# Patient Record
Sex: MALE | Race: WHITE | Employment: OTHER | URBAN - METROPOLITAN AREA
[De-identification: names, ages, dates, MRNs, and addresses within clinical notes are randomized per-mention and may not be internally consistent; named-entity substitution may affect disease eponyms.]

---

## 2017-04-11 RX ORDER — LISINOPRIL 20 MG/1
TABLET ORAL
Qty: 30 TABLET | Refills: 0 | Status: SHIPPED | OUTPATIENT
Start: 2017-04-11 | End: 2017-05-06

## 2017-05-06 ENCOUNTER — OFFICE VISIT (OUTPATIENT)
Dept: INTERNAL MEDICINE CLINIC | Facility: CLINIC | Age: 57
End: 2017-05-06

## 2017-05-06 ENCOUNTER — APPOINTMENT (OUTPATIENT)
Dept: LAB | Facility: HOSPITAL | Age: 57
End: 2017-05-06
Attending: INTERNAL MEDICINE
Payer: COMMERCIAL

## 2017-05-06 VITALS
HEART RATE: 82 BPM | SYSTOLIC BLOOD PRESSURE: 132 MMHG | BODY MASS INDEX: 25.57 KG/M2 | RESPIRATION RATE: 18 BRPM | TEMPERATURE: 98 F | DIASTOLIC BLOOD PRESSURE: 72 MMHG | HEIGHT: 73.2 IN | WEIGHT: 195 LBS

## 2017-05-06 DIAGNOSIS — I10 ESSENTIAL HYPERTENSION: ICD-10-CM

## 2017-05-06 DIAGNOSIS — Z00.00 ANNUAL PHYSICAL EXAM: Primary | ICD-10-CM

## 2017-05-06 DIAGNOSIS — Z00.00 ANNUAL PHYSICAL EXAM: ICD-10-CM

## 2017-05-06 DIAGNOSIS — E78.00 HYPERCHOLESTEROLEMIA: ICD-10-CM

## 2017-05-06 DIAGNOSIS — Z72.0 TOBACCO USE: ICD-10-CM

## 2017-05-06 PROCEDURE — 36415 COLL VENOUS BLD VENIPUNCTURE: CPT

## 2017-05-06 PROCEDURE — 80053 COMPREHEN METABOLIC PANEL: CPT

## 2017-05-06 PROCEDURE — 80061 LIPID PANEL: CPT

## 2017-05-06 PROCEDURE — 85027 COMPLETE CBC AUTOMATED: CPT

## 2017-05-06 PROCEDURE — 99396 PREV VISIT EST AGE 40-64: CPT | Performed by: INTERNAL MEDICINE

## 2017-05-06 RX ORDER — LISINOPRIL 20 MG/1
20 TABLET ORAL
Qty: 30 TABLET | Refills: 5 | Status: SHIPPED | OUTPATIENT
Start: 2017-05-06 | End: 2017-10-21

## 2017-05-06 NOTE — H&P
Ildfeonso Boyce is a 60year old male who presents for a complete physical exam.   HPI:   His last physical was September 2016. \"I feel great. \"  No specific issues for discussion today. He works as a  and is .   Occasional BP checks at loc dysuria or hematuria  MUSCULOSKELETAL: No leg swelling  NEURO: No headaches    EXAM:   GENERAL: Pleasant male appearing well in no distress  /72 mmHg  Pulse 82  Temp(Src) 97.6 °F (36.4 °C) (Oral)  Resp 18  Ht 6' 1.2\" (1.859 m)  Wt 195 lb (88.451 kg) normal.  Await labs    4. Tobacco use  Tobacco cessation advice as above.       Myron Abbott MD  5/6/2017  8:56 AM

## 2017-05-06 NOTE — PATIENT INSTRUCTIONS
Your physical today was normal.  Await results of blood tests. Please continue your current medication. Please continue to eat healthy and exercise regularly. Please try to stop smoking. Return visit in 6 months.

## 2017-10-21 RX ORDER — LISINOPRIL 20 MG/1
20 TABLET ORAL
Qty: 30 TABLET | Refills: 5 | Status: SHIPPED | OUTPATIENT
Start: 2017-10-21 | End: 2017-12-04 | Stop reason: ALTCHOICE

## 2017-12-04 ENCOUNTER — NURSE TRIAGE (OUTPATIENT)
Dept: OTHER | Age: 57
End: 2017-12-04

## 2017-12-04 ENCOUNTER — OFFICE VISIT (OUTPATIENT)
Dept: INTERNAL MEDICINE CLINIC | Facility: CLINIC | Age: 57
End: 2017-12-04

## 2017-12-04 VITALS
HEIGHT: 73 IN | HEART RATE: 94 BPM | WEIGHT: 199 LBS | BODY MASS INDEX: 26.37 KG/M2 | DIASTOLIC BLOOD PRESSURE: 82 MMHG | SYSTOLIC BLOOD PRESSURE: 152 MMHG

## 2017-12-04 DIAGNOSIS — I10 ESSENTIAL HYPERTENSION: Primary | ICD-10-CM

## 2017-12-04 PROCEDURE — 99213 OFFICE O/P EST LOW 20 MIN: CPT | Performed by: INTERNAL MEDICINE

## 2017-12-04 PROCEDURE — 99212 OFFICE O/P EST SF 10 MIN: CPT | Performed by: INTERNAL MEDICINE

## 2017-12-04 RX ORDER — LISINOPRIL AND HYDROCHLOROTHIAZIDE 20; 12.5 MG/1; MG/1
1 TABLET ORAL DAILY
Qty: 30 TABLET | Refills: 5 | Status: SHIPPED | OUTPATIENT
Start: 2017-12-04 | End: 2018-05-21

## 2017-12-04 NOTE — PROGRESS NOTES
Sheldon Tyson is a 59year old male. Patient presents with:  Headache    HPI:   Mr. Richard Jennings presents this afternoon concerned about elevated blood pressure. Since yesterday he has had a bilateral headache especially posteriorly.   Headache persisted today visit in 1 month for blood pressure check. Anticipate next physical May 2018. The patient indicates understanding of these issues and agrees to the plan. The patient is asked to return in 1 month.     Emery Najera MD  12/4/2017  1:51 PM

## 2017-12-04 NOTE — TELEPHONE ENCOUNTER
Action Requested: Summary for Provider     []  Critical Lab, Recommendations Needed  [] Need Additional Advice  []   FYI    []   Need Orders  [] Need Medications Sent to Pharmacy  []  Other     SUMMARY: Patient requesting appt today with Mn.  Appt made for

## 2017-12-04 NOTE — PATIENT INSTRUCTIONS
Please stop lisinopril and begin lisinopril/HCTZ 20/12.5 mg 1 pill daily. Return visit in 1 month for a blood pressure check.

## 2017-12-04 NOTE — TELEPHONE ENCOUNTER
Spoke with wife and states  called her with blood pressure of 174/96. Relayed to her that it is best if I call patient directly. Spoke with patient and he states headache behind ears started yesterday.  Patient states he feels \"a little dizzy, bu

## 2018-01-16 ENCOUNTER — OFFICE VISIT (OUTPATIENT)
Dept: INTERNAL MEDICINE CLINIC | Facility: CLINIC | Age: 58
End: 2018-01-16

## 2018-01-16 VITALS
TEMPERATURE: 99 F | SYSTOLIC BLOOD PRESSURE: 137 MMHG | HEART RATE: 88 BPM | HEIGHT: 73 IN | DIASTOLIC BLOOD PRESSURE: 84 MMHG | WEIGHT: 192 LBS | RESPIRATION RATE: 18 BRPM | BODY MASS INDEX: 25.45 KG/M2

## 2018-01-16 DIAGNOSIS — J06.9 UPPER RESPIRATORY TRACT INFECTION, UNSPECIFIED TYPE: Primary | ICD-10-CM

## 2018-01-16 DIAGNOSIS — I10 ESSENTIAL HYPERTENSION: ICD-10-CM

## 2018-01-16 DIAGNOSIS — H61.23 BILATERAL IMPACTED CERUMEN: ICD-10-CM

## 2018-01-16 PROCEDURE — 99212 OFFICE O/P EST SF 10 MIN: CPT | Performed by: INTERNAL MEDICINE

## 2018-01-16 PROCEDURE — 99214 OFFICE O/P EST MOD 30 MIN: CPT | Performed by: INTERNAL MEDICINE

## 2018-01-16 NOTE — PROGRESS NOTES
Jasmina Wong is a 59year old male. Patient presents with:  Cough: x4 days  Weakness  Hypertension      HPI:    Patient is here with the complaints of  scanty productive cough, going on for 4 days. He has pain in the ribs from coughing. Has chills.  No fe swelling in feet. GI Poor appetite, feels bloated. MUSK: Diffuse muscle pain, body aches. NEURO: denies tingling numbness or headaches  ALLERGY/ASTHMA: No seasonal allergy or asthma. HEME: no anemia, no abnormal bleeding or easy bruising.   PSYCH: Denie to remove , follow-up with ENT. Patient confirms understanding. Other orders  -     carbamide peroxide (DEBROX) 6.5 % Otic Solution; Place 5 drops into both ears 2 (two) times daily.             The patient indicates understanding of these issues and agre

## 2018-01-22 ENCOUNTER — TELEPHONE (OUTPATIENT)
Dept: OTHER | Age: 58
End: 2018-01-22

## 2018-01-22 NOTE — TELEPHONE ENCOUNTER
Pt calling into F/U. Pt calm on phone in no apparent distress. Denies chest pain, SOB, difficulty breathing. Feels very weak and slightly dizzy. No appetite wants to sleep all time. Fatigue and exhausted slight dizzy. Left work early.  Saw Dr Janette John

## 2018-01-23 ENCOUNTER — OFFICE VISIT (OUTPATIENT)
Dept: INTERNAL MEDICINE CLINIC | Facility: CLINIC | Age: 58
End: 2018-01-23

## 2018-01-23 ENCOUNTER — HOSPITAL ENCOUNTER (OUTPATIENT)
Dept: GENERAL RADIOLOGY | Facility: HOSPITAL | Age: 58
Discharge: HOME | End: 2018-01-23
Attending: INTERNAL MEDICINE
Payer: COMMERCIAL

## 2018-01-23 VITALS
SYSTOLIC BLOOD PRESSURE: 126 MMHG | HEIGHT: 73 IN | HEART RATE: 92 BPM | DIASTOLIC BLOOD PRESSURE: 72 MMHG | TEMPERATURE: 99 F | WEIGHT: 192 LBS | BODY MASS INDEX: 25.45 KG/M2

## 2018-01-23 DIAGNOSIS — I10 ESSENTIAL HYPERTENSION: ICD-10-CM

## 2018-01-23 DIAGNOSIS — J06.9 ACUTE URI: Primary | ICD-10-CM

## 2018-01-23 DIAGNOSIS — J06.9 ACUTE URI: ICD-10-CM

## 2018-01-23 PROCEDURE — 99213 OFFICE O/P EST LOW 20 MIN: CPT | Performed by: INTERNAL MEDICINE

## 2018-01-23 PROCEDURE — 71046 X-RAY EXAM CHEST 2 VIEWS: CPT | Performed by: INTERNAL MEDICINE

## 2018-01-23 PROCEDURE — 99212 OFFICE O/P EST SF 10 MIN: CPT | Performed by: INTERNAL MEDICINE

## 2018-01-23 RX ORDER — AMOXICILLIN 500 MG/1
1 CAPSULE ORAL 4 TIMES DAILY
Refills: 0 | COMMUNITY
Start: 2018-01-19 | End: 2018-01-31 | Stop reason: ALTCHOICE

## 2018-01-23 NOTE — PROGRESS NOTES
Jared Beach is a 59year old male. Patient presents with:  Cough  Fatigue    HPI:   Mr. Laurent Alexandre presents this morning for follow-up. At his last visit with me in December, lisinopril was changed to lisinopril/HCTZ.     For the past 9-10 days, he has had o (87.1 kg)   BMI 25.33 kg/m²   HEENT: Anicteric, conjunctiva pink, bilateral cerumen, turbinates normal without purulent drainage, no maxillary or frontal sinus tenderness, oropharynx normal without erythema ulceration swelling or exudate  NECK: Supple with

## 2018-01-23 NOTE — PATIENT INSTRUCTIONS
Await results of chest x-ray. Please continue current medications. Rest and drink extra fluid. Call if no better. Please schedule a physical in May.

## 2018-01-31 ENCOUNTER — OFFICE VISIT (OUTPATIENT)
Dept: INTERNAL MEDICINE CLINIC | Facility: CLINIC | Age: 58
End: 2018-01-31

## 2018-01-31 ENCOUNTER — APPOINTMENT (OUTPATIENT)
Dept: LAB | Facility: HOSPITAL | Age: 58
End: 2018-01-31
Attending: PHYSICIAN ASSISTANT
Payer: COMMERCIAL

## 2018-01-31 VITALS
WEIGHT: 195.31 LBS | DIASTOLIC BLOOD PRESSURE: 80 MMHG | BODY MASS INDEX: 26 KG/M2 | TEMPERATURE: 99 F | SYSTOLIC BLOOD PRESSURE: 142 MMHG | HEART RATE: 88 BPM | RESPIRATION RATE: 16 BRPM

## 2018-01-31 DIAGNOSIS — R10.30 INGUINAL PAIN, UNSPECIFIED LATERALITY: Primary | ICD-10-CM

## 2018-01-31 DIAGNOSIS — R10.30 INGUINAL PAIN, UNSPECIFIED LATERALITY: ICD-10-CM

## 2018-01-31 LAB
BILIRUB UR QL: NEGATIVE
CLARITY UR: CLEAR
COLOR UR: YELLOW
GLUCOSE UR-MCNC: NEGATIVE MG/DL
HGB UR QL STRIP.AUTO: NEGATIVE
KETONES UR-MCNC: NEGATIVE MG/DL
LEUKOCYTE ESTERASE UR QL STRIP.AUTO: NEGATIVE
NITRITE UR QL STRIP.AUTO: NEGATIVE
PH UR: 6 [PH] (ref 5–8)
PROT UR-MCNC: NEGATIVE MG/DL
SP GR UR STRIP: 1.02 (ref 1–1.03)
UROBILINOGEN UR STRIP-ACNC: <2
VIT C UR-MCNC: NEGATIVE MG/DL

## 2018-01-31 PROCEDURE — 81003 URINALYSIS AUTO W/O SCOPE: CPT

## 2018-01-31 PROCEDURE — 99213 OFFICE O/P EST LOW 20 MIN: CPT | Performed by: PHYSICIAN ASSISTANT

## 2018-01-31 PROCEDURE — 87086 URINE CULTURE/COLONY COUNT: CPT

## 2018-01-31 NOTE — PATIENT INSTRUCTIONS
Groin Strain (Adult)     A groin strain is a stretching or partial tearing of the muscle in the lower abdomen or upper thigh. This may happen because of too much coughing, heavy lifting, or active sports.  The pain may last for several days to weeks, depe © 6397-2431 [employer] [address]. [address] [facility], [facility] [place]. All rights reserved. This information is not intended as a substitute for professional medical care. Always follow your healthcare professional's instructions.

## 2018-01-31 NOTE — PROGRESS NOTES
HPI:    Patient ID: Lyudmila Barajas is a 59year old male. HPI   Patient with history of hypertension, hyperlipidemia, and tobacco use presents today with bilateral groin pain since yesterday morning.   He states that when he got dressed and sat down in th reviewed. Constitutional: He appears well-developed and well-nourished. Cardiovascular: Normal rate, regular rhythm and normal heart sounds. Pulmonary/Chest: Effort normal and breath sounds normal. He has no wheezes. He has no rales.    Abdominal: So

## 2018-03-04 ENCOUNTER — OFFICE VISIT (OUTPATIENT)
Dept: FAMILY MEDICINE CLINIC | Facility: CLINIC | Age: 58
End: 2018-03-04

## 2018-03-04 VITALS
BODY MASS INDEX: 26 KG/M2 | OXYGEN SATURATION: 99 % | HEART RATE: 82 BPM | WEIGHT: 196 LBS | DIASTOLIC BLOOD PRESSURE: 82 MMHG | TEMPERATURE: 98 F | SYSTOLIC BLOOD PRESSURE: 148 MMHG

## 2018-03-04 DIAGNOSIS — L03.012 CELLULITIS OF LEFT MIDDLE FINGER: Primary | ICD-10-CM

## 2018-03-04 PROCEDURE — 99202 OFFICE O/P NEW SF 15 MIN: CPT | Performed by: NURSE PRACTITIONER

## 2018-03-04 RX ORDER — CEPHALEXIN 500 MG/1
CAPSULE ORAL
Qty: 21 CAPSULE | Refills: 0 | Status: SHIPPED | OUTPATIENT
Start: 2018-03-04 | End: 2018-11-09

## 2018-03-04 NOTE — PROGRESS NOTES
CHIEF COMPLAINT:   Patient presents with:  Finger Pain: Probable finger infection on left. Sxs very gradual over the past month. HPI:     Lyudmila Barajas is a 59year old male who presents with concerns of infection to left 3rd finger.   He started with /82   Pulse 82   Temp 97.9 °F (36.6 °C) (Oral)   Wt 196 lb   SpO2 99%   BMI 25.86 kg/m²   GENERAL: well developed, well nourished, and in no apparent distress  SKIN: +Left 3rd finger with mild erythema/edema and moderate tenderness- begins at cuticle Cellulitis is treated with antibiotics taken for 7 to 10 days. An open sore may be cleaned and covered with cool wet gauze. Symptoms should get better 1 to 2 days after treatment is started.  Make sure to take all the antibiotics for the full number of days

## 2018-03-04 NOTE — PATIENT INSTRUCTIONS
Cellulitis  Cellulitis is an infection of the deep layers of skin. A break in the skin, such as a cut or scratch, can let bacteria under the skin. If the bacteria get to deep layers of the skin, it can be serious.  If not treated, cellulitis can get into · Fever higher of 100.4º F (38.0º C) or higher after 2 days on antibiotics  Date Last Reviewed: 9/1/2016  © 3789-0662 [employer] [address]. [address] [facility] [address] [place] [address]. All rights reserved.  This information is not intended as a substitut

## 2018-05-21 RX ORDER — LISINOPRIL AND HYDROCHLOROTHIAZIDE 20; 12.5 MG/1; MG/1
1 TABLET ORAL DAILY
Qty: 90 TABLET | Refills: 0 | Status: SHIPPED | OUTPATIENT
Start: 2018-05-21 | End: 2018-07-12

## 2018-05-21 NOTE — TELEPHONE ENCOUNTER
Hypertensive Medications  Protocol Criteria:  · Appointment scheduled in the past 6 months or in the next 3 months  · BMP or CMP in the past 12 months  · Creatinine result < 2  Recent Outpatient Visits            2 months ago Cellulitis of left middle fing

## 2018-07-12 RX ORDER — LISINOPRIL AND HYDROCHLOROTHIAZIDE 20; 12.5 MG/1; MG/1
TABLET ORAL
Qty: 90 TABLET | Refills: 0 | Status: SHIPPED | OUTPATIENT
Start: 2018-07-12 | End: 2018-11-09

## 2018-11-09 ENCOUNTER — OFFICE VISIT (OUTPATIENT)
Dept: INTERNAL MEDICINE CLINIC | Facility: CLINIC | Age: 58
End: 2018-11-09

## 2018-11-09 ENCOUNTER — APPOINTMENT (OUTPATIENT)
Dept: LAB | Facility: HOSPITAL | Age: 58
End: 2018-11-09
Attending: INTERNAL MEDICINE
Payer: COMMERCIAL

## 2018-11-09 VITALS
BODY MASS INDEX: 26.71 KG/M2 | DIASTOLIC BLOOD PRESSURE: 78 MMHG | SYSTOLIC BLOOD PRESSURE: 146 MMHG | HEIGHT: 73 IN | WEIGHT: 201.5 LBS | HEART RATE: 85 BPM

## 2018-11-09 DIAGNOSIS — I10 ESSENTIAL HYPERTENSION: ICD-10-CM

## 2018-11-09 DIAGNOSIS — E78.00 HYPERCHOLESTEROLEMIA: ICD-10-CM

## 2018-11-09 DIAGNOSIS — Z00.00 ANNUAL PHYSICAL EXAM: Primary | ICD-10-CM

## 2018-11-09 DIAGNOSIS — I70.0 AORTIC ATHEROSCLEROSIS (HCC): ICD-10-CM

## 2018-11-09 DIAGNOSIS — Z00.00 ANNUAL PHYSICAL EXAM: ICD-10-CM

## 2018-11-09 DIAGNOSIS — Z72.0 TOBACCO USE: ICD-10-CM

## 2018-11-09 PROCEDURE — 99396 PREV VISIT EST AGE 40-64: CPT | Performed by: INTERNAL MEDICINE

## 2018-11-09 PROCEDURE — 36415 COLL VENOUS BLD VENIPUNCTURE: CPT

## 2018-11-09 PROCEDURE — 80053 COMPREHEN METABOLIC PANEL: CPT

## 2018-11-09 PROCEDURE — 85027 COMPLETE CBC AUTOMATED: CPT

## 2018-11-09 PROCEDURE — 80061 LIPID PANEL: CPT

## 2018-11-09 PROCEDURE — 83036 HEMOGLOBIN GLYCOSYLATED A1C: CPT

## 2018-11-09 RX ORDER — LISINOPRIL AND HYDROCHLOROTHIAZIDE 20; 12.5 MG/1; MG/1
1 TABLET ORAL
Qty: 90 TABLET | Refills: 1 | Status: SHIPPED | OUTPATIENT
Start: 2018-11-09 | End: 2019-05-04

## 2018-11-09 RX ORDER — AMLODIPINE BESYLATE 2.5 MG/1
2.5 TABLET ORAL DAILY
Qty: 90 TABLET | Refills: 1 | Status: SHIPPED | OUTPATIENT
Start: 2018-11-09 | End: 2019-05-04

## 2018-11-09 NOTE — H&P
Lopez Sanchez is a 59year old male who presents for a complete physical exam.   HPI:   His last physical was May 2017. \"I feel great. \"  No specific issues for discussion today. Retired .  Lives with his wife.   Occasional BP checks at [facility] breath  CARDIAC: No lightheadedness palpitations or chest pain  GI: No anorexia heartburn dysphagia nausea vomiting abdominal pain diarrhea constipation or rectal bleeding  : No urinary frequency dysuria or hematuria  MUSCULOSKELETAL: No leg swelling  NE Future  - OCCULT BLOOD, FECAL, IMMUNOASSAY; Future    2. Essential hypertension  BP elevated. Add amlodipine as above with follow-up in 2 months    3. Hypercholesterolemia  Await labs    4. Aortic atherosclerosis (HCC)  Asymptomatic.   Risk factor control

## 2018-11-09 NOTE — PATIENT INSTRUCTIONS
Await results of today's blood tests. Please also perform stool testing to check your colon. Please try to stop smoking. Please follow a healthy diet and try to exercise regularly. Continue lisinopril/HCTZ. Begin amlodipine 2.5 mg 1 pill daily.   Retur

## 2018-11-16 ENCOUNTER — TELEPHONE (OUTPATIENT)
Dept: OTHER | Age: 58
End: 2018-11-16

## 2018-11-16 NOTE — TELEPHONE ENCOUNTER
Noted
Pt states he received his test results and had questions about managing his cholesterol as noted his levels were elevated. Reviewed diet and exercise information with pt, information from references in Epic mailed to pt at home address on file.     Doctor,
EENMT

## 2019-01-21 ENCOUNTER — OFFICE VISIT (OUTPATIENT)
Dept: FAMILY MEDICINE CLINIC | Facility: CLINIC | Age: 59
End: 2019-01-21

## 2019-01-21 ENCOUNTER — TELEPHONE (OUTPATIENT)
Dept: INTERNAL MEDICINE CLINIC | Facility: CLINIC | Age: 59
End: 2019-01-21

## 2019-01-21 VITALS
WEIGHT: 202 LBS | HEART RATE: 80 BPM | OXYGEN SATURATION: 99 % | SYSTOLIC BLOOD PRESSURE: 136 MMHG | BODY MASS INDEX: 26.77 KG/M2 | TEMPERATURE: 98 F | DIASTOLIC BLOOD PRESSURE: 78 MMHG | HEIGHT: 73 IN | RESPIRATION RATE: 18 BRPM

## 2019-01-21 DIAGNOSIS — J06.9 VIRAL URI WITH COUGH: Primary | ICD-10-CM

## 2019-01-21 DIAGNOSIS — F17.200 CURRENT SMOKER: ICD-10-CM

## 2019-01-21 PROCEDURE — 99213 OFFICE O/P EST LOW 20 MIN: CPT | Performed by: NURSE PRACTITIONER

## 2019-01-21 NOTE — PATIENT INSTRUCTIONS
· Hydrate! (cold or hot based on comfort). Drink lots of water or other non dehydrating liquids to help with illness. Salty foods, soups and tea can help with throat pain.    · Hand washing-use hand  or wash hands frequently, cover your cough · If symptoms are severe, rest at home for the first 2 to 3 days. When you resume activity, don't let yourself get too tired. · Avoid being exposed to cigarette smoke (yours or others’).   · You may use acetaminophen or ibuprofen to control pain and fever, © 0567-1005 [employer] [address]. [address] [facility], [address] [place]. All rights reserved. This information is not intended as a substitute for professional medical care. Always follow your healthcare professional's instructions.           Adult · As your appetite returns, you can resume your normal diet. Ask your healthcare provider if there are any foods you should avoid.   When to seek medical care  When you first notice symptoms, ask your healthcare provider if antiviral medicines are appropria

## 2019-01-21 NOTE — TELEPHONE ENCOUNTER
Pt said he was just called to let the PCP know his BP is doing perfect and he wanted to give the reading of the /78    Just want to advise

## 2019-01-21 NOTE — PROGRESS NOTES
CHIEF COMPLAINT:   Patient presents with:  Cough/URI      HPI:   Meghan Duff is a 59year old male who presents for uri symptoms for  1 weeks. Patient reports congestion, dry cough. Symptoms have been improving since onset.   Treating symptoms with hernandez /78 (BP Location: Left arm, Patient Position: Sitting, Cuff Size: large)   Pulse 80   Temp 98.1 °F (36.7 °C) (Oral)   Resp 18   Ht 73\"   Wt 202 lb   SpO2 99%   BMI 26.65 kg/m²   GENERAL: well developed, well nourished,in no apparent distress  SKIN: · Hand washing-use hand  or wash hands frequently, cover your cough or sneeze, do not share towels or drinks with others. · Salt water gargles (1 tsp. Salt in 6 oz lukewarm water), use several times daily to help decrease swelling and pain.   · Us · You may use acetaminophen or ibuprofen to control pain and fever, unless another medicine was prescribed. If you have chronic liver or kidney disease, have ever had a stomach ulcer or gastrointestinal bleeding, or are taking blood-thinning medicines, inocencia Colds are caused by viruses. They can't be cured with antibiotics. However, you can ease symptoms and support your body's efforts to heal itself.  No matter which symptoms you have, be sure to:  · Drink plenty of fluids (water or clear soup)  · Stop smoking When you first notice symptoms, ask your healthcare provider if antiviral medicines are appropriate. Antibiotics should not be taken for colds or flu.  Also, call your healthcare provider if you have any of the following symptoms or if you aren't feeling be

## 2019-05-04 RX ORDER — AMLODIPINE BESYLATE 2.5 MG/1
TABLET ORAL
Qty: 90 TABLET | Refills: 0 | Status: SHIPPED | OUTPATIENT
Start: 2019-05-04 | End: 2019-08-04

## 2019-05-04 RX ORDER — LISINOPRIL AND HYDROCHLOROTHIAZIDE 20; 12.5 MG/1; MG/1
TABLET ORAL
Qty: 90 TABLET | Refills: 0 | Status: SHIPPED | OUTPATIENT
Start: 2019-05-04 | End: 2019-08-04

## 2019-08-05 RX ORDER — LISINOPRIL AND HYDROCHLOROTHIAZIDE 20; 12.5 MG/1; MG/1
TABLET ORAL
Qty: 90 TABLET | Refills: 0 | Status: SHIPPED | OUTPATIENT
Start: 2019-08-05 | End: 2019-10-26

## 2019-08-05 RX ORDER — AMLODIPINE BESYLATE 2.5 MG/1
TABLET ORAL
Qty: 90 TABLET | Refills: 0 | Status: SHIPPED | OUTPATIENT
Start: 2019-08-05 | End: 2019-10-29

## 2019-08-07 NOTE — TELEPHONE ENCOUNTER
Pt's spouse called in to advise that pt will wait to schedulel his px appt for November. Pt is looking to come in on a Saturday. Pt's spouse stated that pt will call back in October to schedule a px.

## 2019-09-23 ENCOUNTER — TELEPHONE (OUTPATIENT)
Dept: CASE MANAGEMENT | Age: 59
End: 2019-09-23

## 2019-10-09 NOTE — TELEPHONE ENCOUNTER
Pt is requesting refill on Clotrimazole-Betamethasone Cream  Apply 1 Tube Topically 2(two) times daily

## 2019-10-14 RX ORDER — CLOTRIMAZOLE AND BETAMETHASONE DIPROPIONATE 10; .64 MG/G; MG/G
1 CREAM TOPICAL 2 TIMES DAILY
Qty: 45 G | Refills: 1 | Status: SHIPPED | OUTPATIENT
Start: 2019-10-14 | End: 2020-10-13

## 2019-10-14 RX ORDER — CLOTRIMAZOLE AND BETAMETHASONE DIPROPIONATE 10; .64 MG/G; MG/G
1 CREAM TOPICAL 2 TIMES DAILY
Qty: 15 G | Refills: 1 | Status: SHIPPED | OUTPATIENT
Start: 2019-10-14 | End: 2019-10-14

## 2019-10-14 NOTE — TELEPHONE ENCOUNTER
Wife called asking if the cream can be filled.  Patient will be seeing Dr Neil Garcia in November for a physical.    Script is pended for review and approval. thanks

## 2019-10-28 RX ORDER — LISINOPRIL AND HYDROCHLOROTHIAZIDE 20; 12.5 MG/1; MG/1
TABLET ORAL
Qty: 90 TABLET | Refills: 1 | Status: SHIPPED | OUTPATIENT
Start: 2019-10-28 | End: 2019-12-21

## 2019-10-30 RX ORDER — AMLODIPINE BESYLATE 2.5 MG/1
TABLET ORAL
Qty: 90 TABLET | Refills: 0 | Status: SHIPPED | OUTPATIENT
Start: 2019-10-30 | End: 2019-12-21

## 2019-10-30 NOTE — TELEPHONE ENCOUNTER
pls advise, thanks in advance.        Hypertensive Medications  Protocol Criteria:  · Appointment scheduled in the past 6 months or in the next 3 months  · BMP or CMP in the past 12 months  · Creatinine result < 2  Recent Outpatient Visits            9 cait

## 2019-12-21 ENCOUNTER — OFFICE VISIT (OUTPATIENT)
Dept: INTERNAL MEDICINE CLINIC | Facility: CLINIC | Age: 59
End: 2019-12-21

## 2019-12-21 ENCOUNTER — APPOINTMENT (OUTPATIENT)
Dept: LAB | Facility: HOSPITAL | Age: 59
End: 2019-12-21
Attending: INTERNAL MEDICINE
Payer: COMMERCIAL

## 2019-12-21 VITALS
DIASTOLIC BLOOD PRESSURE: 78 MMHG | SYSTOLIC BLOOD PRESSURE: 132 MMHG | HEIGHT: 73 IN | WEIGHT: 196 LBS | RESPIRATION RATE: 16 BRPM | HEART RATE: 86 BPM | BODY MASS INDEX: 25.98 KG/M2

## 2019-12-21 DIAGNOSIS — E78.00 HYPERCHOLESTEROLEMIA: ICD-10-CM

## 2019-12-21 DIAGNOSIS — I70.0 AORTIC ATHEROSCLEROSIS (HCC): ICD-10-CM

## 2019-12-21 DIAGNOSIS — Z00.00 ANNUAL PHYSICAL EXAM: Primary | ICD-10-CM

## 2019-12-21 DIAGNOSIS — I10 ESSENTIAL HYPERTENSION: ICD-10-CM

## 2019-12-21 DIAGNOSIS — Z00.00 ANNUAL PHYSICAL EXAM: ICD-10-CM

## 2019-12-21 DIAGNOSIS — Z72.0 TOBACCO USE: ICD-10-CM

## 2019-12-21 PROCEDURE — 80061 LIPID PANEL: CPT

## 2019-12-21 PROCEDURE — 99396 PREV VISIT EST AGE 40-64: CPT | Performed by: INTERNAL MEDICINE

## 2019-12-21 PROCEDURE — 36415 COLL VENOUS BLD VENIPUNCTURE: CPT

## 2019-12-21 PROCEDURE — 83036 HEMOGLOBIN GLYCOSYLATED A1C: CPT

## 2019-12-21 PROCEDURE — 80053 COMPREHEN METABOLIC PANEL: CPT

## 2019-12-21 PROCEDURE — 85027 COMPLETE CBC AUTOMATED: CPT

## 2019-12-21 RX ORDER — LISINOPRIL AND HYDROCHLOROTHIAZIDE 20; 12.5 MG/1; MG/1
1 TABLET ORAL
Qty: 90 TABLET | Refills: 1 | Status: SHIPPED | OUTPATIENT
Start: 2019-12-21 | End: 2020-08-10

## 2019-12-21 RX ORDER — AMLODIPINE BESYLATE 2.5 MG/1
2.5 TABLET ORAL
Qty: 90 TABLET | Refills: 1 | Status: SHIPPED | OUTPATIENT
Start: 2019-12-21 | End: 2020-07-18

## 2019-12-21 NOTE — PATIENT INSTRUCTIONS
Await results of blood tests. Please complete the stool card for colon cancer screening. Continue current medications. Please continue to try to stop smoking. Continue healthy diet and regular walking. Return visit in 6 months.

## 2019-12-21 NOTE — H&P
Meghan Duff is a 62year old male who presents for a complete physical exam.   HPI:   His last visit here was for a physical November 2018. At that time, amlodipine was started and follow-up was advised. Lately he has been feeling very well.   No spec Maternal Grandmother    • Hypertension Maternal Aunt    • Hypertension Maternal Uncle       Social History:  Social History    Tobacco Use      Smoking status: Current Every Day Smoker        Packs/day: 1.00        Years: 35.00        Pack years: 28 FIT sent. Continue current medications. Prescription refills for 6 months sent to pharmacy. Continue healthy diet and regular exercise, maintaining current body weight. Encouraged further attempts at tobacco cessation. Return visit in 6 months.   - amL

## 2019-12-26 ENCOUNTER — TELEPHONE (OUTPATIENT)
Dept: INTERNAL MEDICINE CLINIC | Facility: CLINIC | Age: 59
End: 2019-12-26

## 2019-12-26 DIAGNOSIS — E78.00 HYPERCHOLESTEROLEMIA: Primary | ICD-10-CM

## 2019-12-26 NOTE — TELEPHONE ENCOUNTER
Henry Juarez, patient's wife, stated that her  agrees to take the cholestrol medication. (See LETTER 12/21/19)      Thank you.

## 2019-12-27 RX ORDER — ATORVASTATIN CALCIUM 20 MG/1
20 TABLET, FILM COATED ORAL NIGHTLY
Qty: 90 TABLET | Refills: 1 | Status: SHIPPED | OUTPATIENT
Start: 2019-12-27 | End: 2020-03-27 | Stop reason: ALTCHOICE

## 2019-12-27 NOTE — TELEPHONE ENCOUNTER
RX for atorvastatin 20 mg one pill nightly sent to pharmacy. Recommend labs for lipid panel, AST and ALT in 2 months.  Order signed

## 2019-12-27 NOTE — TELEPHONE ENCOUNTER
Dr. Mainsha Hamlin, patient agrees to start cholesterol medication. Please advise.      Please reply to pool: EM TRIAGE SUPPORT

## 2019-12-27 NOTE — TELEPHONE ENCOUNTER
Left detailed message for patient (JOHN on file to do so) regarding provider's response/orders below. Advised to call back with questions or concerns.

## 2020-02-01 ENCOUNTER — NURSE TRIAGE (OUTPATIENT)
Dept: OTHER | Age: 60
End: 2020-02-01

## 2020-02-01 NOTE — TELEPHONE ENCOUNTER
Action Requested: Summary for Provider     []  Critical Lab, Recommendations Needed  [] Need Additional Advice  []   FYI    []   Need Orders  [] Need Medications Sent to Pharmacy  []  Other     SUMMARY: Spouse report left hand index finger lump by the knuc

## 2020-02-14 NOTE — TELEPHONE ENCOUNTER
Wife calling as she wants to changed her Saturday appt to Wed because she has a appt with  on Wed. I noted pt did not have a appt with  for Saturday feb 15. Appt was made for pt to see  on 2/19/2020 at 9:50.  Per wife the lump is

## 2020-02-19 ENCOUNTER — OFFICE VISIT (OUTPATIENT)
Dept: INTERNAL MEDICINE CLINIC | Facility: CLINIC | Age: 60
End: 2020-02-19

## 2020-02-19 VITALS
HEIGHT: 73 IN | WEIGHT: 195.63 LBS | BODY MASS INDEX: 25.93 KG/M2 | HEART RATE: 84 BPM | DIASTOLIC BLOOD PRESSURE: 76 MMHG | SYSTOLIC BLOOD PRESSURE: 136 MMHG

## 2020-02-19 DIAGNOSIS — L72.3 SEBACEOUS CYST OF FINGER: Primary | ICD-10-CM

## 2020-02-19 PROCEDURE — 99213 OFFICE O/P EST LOW 20 MIN: CPT | Performed by: INTERNAL MEDICINE

## 2020-02-19 NOTE — PROGRESS NOTES
Milan Aparicio is a 62year old male. Patient presents with:  Lump: middle finger on left hand    HPI:   For the past 1 month he has had a persistent lump on his left middle finger. No injury or trauma. Size stable. No pain or discomfort.   No bleeding or approximately 5 mm soft nontender subcutaneous mass dorsal surface proximal phalanx left middle finger without overlying erythema or warmth      ASSESSMENT AND PLAN:   1.  Sebaceous cyst of finger  Benign cyst.  Discussed options including observation versu

## 2020-02-19 NOTE — PATIENT INSTRUCTIONS
Observe the small cyst on your left middle finger and call or return if it worsens. Begin atorvastatin and recheck labs in 4-6 weeks. Return visit in June.

## 2020-03-27 ENCOUNTER — TELEPHONE (OUTPATIENT)
Dept: INTERNAL MEDICINE CLINIC | Facility: CLINIC | Age: 60
End: 2020-03-27

## 2020-03-27 RX ORDER — ROSUVASTATIN CALCIUM 10 MG/1
10 TABLET, COATED ORAL NIGHTLY
Qty: 30 TABLET | Refills: 5 | Status: SHIPPED | OUTPATIENT
Start: 2020-03-27 | End: 2021-01-22

## 2020-03-27 NOTE — TELEPHONE ENCOUNTER
Talked to the wife who is on HIPPA with the patient by her and informed of the new medication sent to the pharmacy with understanding.

## 2020-03-27 NOTE — TELEPHONE ENCOUNTER
Call transferred by CSS: Spouse Martha (JOHN on file) states pt had been taking atorvastatin 20 mg since December 2019 and has had loose stools mainly in the morning with pelvic cramping.  States they decided to stop it for 2 days since they read these were

## 2020-07-18 DIAGNOSIS — Z00.00 ANNUAL PHYSICAL EXAM: ICD-10-CM

## 2020-07-18 RX ORDER — AMLODIPINE BESYLATE 2.5 MG/1
TABLET ORAL
Qty: 90 TABLET | Refills: 0 | Status: SHIPPED | OUTPATIENT
Start: 2020-07-18 | End: 2020-10-24

## 2020-08-10 ENCOUNTER — TELEPHONE (OUTPATIENT)
Dept: INTERNAL MEDICINE CLINIC | Facility: CLINIC | Age: 60
End: 2020-08-10

## 2020-08-10 DIAGNOSIS — Z00.00 ANNUAL PHYSICAL EXAM: ICD-10-CM

## 2020-08-10 RX ORDER — LISINOPRIL AND HYDROCHLOROTHIAZIDE 20; 12.5 MG/1; MG/1
1 TABLET ORAL
Qty: 30 TABLET | Refills: 0 | Status: SHIPPED | OUTPATIENT
Start: 2020-08-10 | End: 2020-09-06

## 2020-08-10 NOTE — TELEPHONE ENCOUNTER
Patient called and advised he got called to be out of town this weekend for business unexpectedly. They would like to know if he can get a 1 month supply of the medication listed below? Patient has about a week left. Please Advise.       Please Use Phar

## 2020-08-27 NOTE — TELEPHONE ENCOUNTER
Patient's wife calling in, patient is pretty sure he cannot make appt he had scheduled. They are requesting that you refill until his annual physical is due in December. Please contact patient's wife and leave message on machine with 's answer.

## 2020-08-27 NOTE — TELEPHONE ENCOUNTER
As I have previously stated, he should schedule an office visit.   If he will not or cannot, I will refill his medications until December but he needs to see me in December for a physical.  If he does not come in for a physical in December, I will not feel

## 2020-08-27 NOTE — TELEPHONE ENCOUNTER
Contacted bobby, pt wife. Informed message below . Pt wife verbalized understanding. Will make appt for physical. Transferred pt to central scheduling.

## 2020-09-06 DIAGNOSIS — Z00.00 ANNUAL PHYSICAL EXAM: ICD-10-CM

## 2020-09-06 RX ORDER — LISINOPRIL AND HYDROCHLOROTHIAZIDE 20; 12.5 MG/1; MG/1
TABLET ORAL
Qty: 30 TABLET | Refills: 2 | Status: SHIPPED | OUTPATIENT
Start: 2020-09-06 | End: 2020-11-09

## 2020-10-24 DIAGNOSIS — Z00.00 ANNUAL PHYSICAL EXAM: ICD-10-CM

## 2020-10-24 RX ORDER — AMLODIPINE BESYLATE 2.5 MG/1
TABLET ORAL
Qty: 90 TABLET | Refills: 0 | Status: SHIPPED | OUTPATIENT
Start: 2020-10-24 | End: 2021-01-22

## 2020-11-09 ENCOUNTER — VIRTUAL PHONE E/M (OUTPATIENT)
Dept: INTERNAL MEDICINE CLINIC | Facility: CLINIC | Age: 60
End: 2020-11-09

## 2020-11-09 DIAGNOSIS — I10 ESSENTIAL HYPERTENSION: Primary | ICD-10-CM

## 2020-11-09 DIAGNOSIS — E78.00 HYPERCHOLESTEROLEMIA: ICD-10-CM

## 2020-11-09 DIAGNOSIS — Z00.00 ANNUAL PHYSICAL EXAM: ICD-10-CM

## 2020-11-09 PROCEDURE — 99213 OFFICE O/P EST LOW 20 MIN: CPT | Performed by: INTERNAL MEDICINE

## 2020-11-09 RX ORDER — LISINOPRIL AND HYDROCHLOROTHIAZIDE 20; 12.5 MG/1; MG/1
1 TABLET ORAL DAILY
Qty: 30 TABLET | Refills: 2 | Status: SHIPPED | OUTPATIENT
Start: 2020-11-09 | End: 2021-01-22

## 2020-11-09 NOTE — PROGRESS NOTES
Virtual Telephone Check-In    Lexine Collet verbally consents to a Virtual/Telephone Check-In visit on 11/09/20. Patient has been referred to the [facility] website at [email or web address] to review the yearly Consent to Treat document.     Patient Judy Cisneros Social History:  Social History    Tobacco Use      Smoking status: Current Every Day Smoker        Packs/day: 1.00        Years: 35.00        Pack years: 35        Types: Cigarettes      Smokeless tobacco: Never Used      Tobacco comment: started at age

## 2021-01-22 ENCOUNTER — OFFICE VISIT (OUTPATIENT)
Dept: INTERNAL MEDICINE CLINIC | Facility: CLINIC | Age: 61
End: 2021-01-22

## 2021-01-22 ENCOUNTER — LAB ENCOUNTER (OUTPATIENT)
Dept: LAB | Facility: HOSPITAL | Age: 61
End: 2021-01-22
Attending: INTERNAL MEDICINE
Payer: COMMERCIAL

## 2021-01-22 VITALS
HEIGHT: 73 IN | DIASTOLIC BLOOD PRESSURE: 76 MMHG | RESPIRATION RATE: 18 BRPM | WEIGHT: 197.13 LBS | SYSTOLIC BLOOD PRESSURE: 148 MMHG | HEART RATE: 88 BPM | BODY MASS INDEX: 26.13 KG/M2

## 2021-01-22 DIAGNOSIS — Z00.00 ANNUAL PHYSICAL EXAM: ICD-10-CM

## 2021-01-22 DIAGNOSIS — Z72.0 TOBACCO USE: ICD-10-CM

## 2021-01-22 DIAGNOSIS — I10 ESSENTIAL HYPERTENSION: Primary | ICD-10-CM

## 2021-01-22 DIAGNOSIS — E78.00 HYPERCHOLESTEROLEMIA: ICD-10-CM

## 2021-01-22 DIAGNOSIS — I70.0 AORTIC ATHEROSCLEROSIS (HCC): ICD-10-CM

## 2021-01-22 LAB
ALBUMIN SERPL-MCNC: 3.8 G/DL (ref 3.4–5)
ALBUMIN/GLOB SERPL: 1 {RATIO} (ref 1–2)
ALP LIVER SERPL-CCNC: 70 U/L
ALT SERPL-CCNC: 43 U/L
ANION GAP SERPL CALC-SCNC: 6 MMOL/L (ref 0–18)
AST SERPL-CCNC: 24 U/L (ref 15–37)
BILIRUB SERPL-MCNC: 0.5 MG/DL (ref 0.1–2)
BUN BLD-MCNC: 14 MG/DL (ref 7–18)
BUN/CREAT SERPL: 14.9 (ref 10–20)
CALCIUM BLD-MCNC: 9.3 MG/DL (ref 8.5–10.1)
CHLORIDE SERPL-SCNC: 104 MMOL/L (ref 98–112)
CHOLEST SMN-MCNC: 202 MG/DL (ref ?–200)
CO2 SERPL-SCNC: 27 MMOL/L (ref 21–32)
COMPLEXED PSA SERPL-MCNC: 0.96 NG/ML (ref ?–4)
CREAT BLD-MCNC: 0.94 MG/DL
DEPRECATED RDW RBC AUTO: 46 FL (ref 35.1–46.3)
ERYTHROCYTE [DISTWIDTH] IN BLOOD BY AUTOMATED COUNT: 12.9 % (ref 11–15)
EST. AVERAGE GLUCOSE BLD GHB EST-MCNC: 123 MG/DL (ref 68–126)
GLOBULIN PLAS-MCNC: 3.7 G/DL (ref 2.8–4.4)
GLUCOSE BLD-MCNC: 91 MG/DL (ref 70–99)
HBA1C MFR BLD HPLC: 5.9 % (ref ?–5.7)
HCT VFR BLD AUTO: 45.7 %
HDLC SERPL-MCNC: 60 MG/DL (ref 40–59)
HGB BLD-MCNC: 15.2 G/DL
LDLC SERPL CALC-MCNC: 121 MG/DL (ref ?–100)
M PROTEIN MFR SERPL ELPH: 7.5 G/DL (ref 6.4–8.2)
MCH RBC QN AUTO: 32.4 PG (ref 26–34)
MCHC RBC AUTO-ENTMCNC: 33.3 G/DL (ref 31–37)
MCV RBC AUTO: 97.4 FL
NONHDLC SERPL-MCNC: 142 MG/DL (ref ?–130)
OSMOLALITY SERPL CALC.SUM OF ELEC: 284 MOSM/KG (ref 275–295)
PATIENT FASTING Y/N/NP: YES
PATIENT FASTING Y/N/NP: YES
PLATELET # BLD AUTO: 241 10(3)UL (ref 150–450)
POTASSIUM SERPL-SCNC: 4.5 MMOL/L (ref 3.5–5.1)
RBC # BLD AUTO: 4.69 X10(6)UL
SODIUM SERPL-SCNC: 137 MMOL/L (ref 136–145)
TRIGL SERPL-MCNC: 106 MG/DL (ref 30–149)
VLDLC SERPL CALC-MCNC: 21 MG/DL (ref 0–30)
WBC # BLD AUTO: 7.8 X10(3) UL (ref 4–11)

## 2021-01-22 PROCEDURE — 83036 HEMOGLOBIN GLYCOSYLATED A1C: CPT

## 2021-01-22 PROCEDURE — 3077F SYST BP >= 140 MM HG: CPT | Performed by: INTERNAL MEDICINE

## 2021-01-22 PROCEDURE — 80061 LIPID PANEL: CPT

## 2021-01-22 PROCEDURE — 3008F BODY MASS INDEX DOCD: CPT | Performed by: INTERNAL MEDICINE

## 2021-01-22 PROCEDURE — 36415 COLL VENOUS BLD VENIPUNCTURE: CPT

## 2021-01-22 PROCEDURE — 99396 PREV VISIT EST AGE 40-64: CPT | Performed by: INTERNAL MEDICINE

## 2021-01-22 PROCEDURE — 80053 COMPREHEN METABOLIC PANEL: CPT

## 2021-01-22 PROCEDURE — 85027 COMPLETE CBC AUTOMATED: CPT

## 2021-01-22 PROCEDURE — 3078F DIAST BP <80 MM HG: CPT | Performed by: INTERNAL MEDICINE

## 2021-01-22 RX ORDER — LISINOPRIL AND HYDROCHLOROTHIAZIDE 20; 12.5 MG/1; MG/1
1 TABLET ORAL DAILY
Qty: 90 TABLET | Refills: 1 | Status: SHIPPED | OUTPATIENT
Start: 2021-01-22 | End: 2021-09-07

## 2021-01-22 RX ORDER — AMLODIPINE BESYLATE 2.5 MG/1
TABLET ORAL
Qty: 90 TABLET | Refills: 0 | Status: SHIPPED | OUTPATIENT
Start: 2021-01-22 | End: 2021-01-22 | Stop reason: DRUGHIGH

## 2021-01-22 RX ORDER — AMLODIPINE BESYLATE 5 MG/1
5 TABLET ORAL DAILY
Qty: 90 TABLET | Refills: 1 | Status: SHIPPED | OUTPATIENT
Start: 2021-01-22 | End: 2021-07-14

## 2021-01-22 NOTE — PATIENT INSTRUCTIONS
Await results of today's blood tests. Please complete the stool card at home for colon cancer screening. Increase amlodipine to 5 mg daily. Continue lisinopril/HCTZ. Please try to stop smoking. Return visit in 1 month for a blood pressure check.

## 2021-01-22 NOTE — H&P
Kaden Briceno is a 56year old male who presents for a complete physical exam, as well as for follow-up of hypertension. HPI:   His last physical was December 2019. He has been isolating and distancing during the pandemic. \"I feel great. \"  No specific years: 35        Types: Cigarettes      Smokeless tobacco: Never Used      Tobacco comment: started at age 20    Alcohol use: Yes      Comment: Occasional beer    Drug use: No          REVIEW OF SYSTEMS:   GENERAL: No fever  LUNGS: No cough wheezing or adonay mg daily. Prescription sent to pharmacy. Continue lisinopril/HCTZ. Prescription refill sent to pharmacy. Return visit in 1 month for blood pressure check. - Lisinopril-hydroCHLOROthiazide 20-12.5 MG Oral Tab; Take 1 tablet by mouth daily.   Dispense: 3

## 2021-01-26 ENCOUNTER — TELEPHONE (OUTPATIENT)
Dept: INTERNAL MEDICINE CLINIC | Facility: CLINIC | Age: 61
End: 2021-01-26

## 2021-01-26 NOTE — TELEPHONE ENCOUNTER
Symptoms somewhat nonspecific. Glad that he feels better. Would recommend he continue with current blood pressure medication, including higher dose of amlodipine as recently prescribed.   Recommend he schedule an in person visit tomorrow if symptoms persi

## 2021-01-26 NOTE — TELEPHONE ENCOUNTER
Called pt and states feeling a little better now. Advised pt of providers note below. Apt made for tomorrow at 10:50. And states wife went to purchase a b/p cuff for monitoring at home.  Pt advised to call back for worsening of symptoms and he verbalized un

## 2021-01-26 NOTE — TELEPHONE ENCOUNTER
Spoke with spouse Martha--reports patient called her this morning from work--he didn't feel like himself and had to leave work. Spoke with patient--states, \"I just felt cotton mouthed and loopy this morning--it lasted less than an hour.  I feel better n

## 2021-01-27 ENCOUNTER — OFFICE VISIT (OUTPATIENT)
Dept: INTERNAL MEDICINE CLINIC | Facility: CLINIC | Age: 61
End: 2021-01-27

## 2021-01-27 VITALS
WEIGHT: 194.88 LBS | BODY MASS INDEX: 25.83 KG/M2 | HEIGHT: 73 IN | HEART RATE: 93 BPM | SYSTOLIC BLOOD PRESSURE: 123 MMHG | DIASTOLIC BLOOD PRESSURE: 81 MMHG

## 2021-01-27 DIAGNOSIS — R42 LIGHTHEADEDNESS: Primary | ICD-10-CM

## 2021-01-27 DIAGNOSIS — I10 ESSENTIAL HYPERTENSION: ICD-10-CM

## 2021-01-27 PROCEDURE — 3008F BODY MASS INDEX DOCD: CPT | Performed by: INTERNAL MEDICINE

## 2021-01-27 PROCEDURE — 3074F SYST BP LT 130 MM HG: CPT | Performed by: INTERNAL MEDICINE

## 2021-01-27 PROCEDURE — 3079F DIAST BP 80-89 MM HG: CPT | Performed by: INTERNAL MEDICINE

## 2021-01-27 PROCEDURE — 99213 OFFICE O/P EST LOW 20 MIN: CPT | Performed by: INTERNAL MEDICINE

## 2021-01-27 NOTE — PROGRESS NOTES
Maricel Pérez is a 56year old male. Patient presents with:  Hypertension: medication questions    HPI:   At his visit 1 week ago, dose of amlodipine was increased because of elevated blood pressure.     Yesterday morning, while at work, he stood up and fel standing without lightheadedness  LUNGS: Resonant to percussion and clear to auscultation  CARDIAC: Rhythm regular S1 S2 normal without murmur   ABDOMEN: Bowel sounds normal soft nontender       ASSESSMENT AND PLAN:   1.  Lightheadedness  Single episode now

## 2021-02-26 ENCOUNTER — OFFICE VISIT (OUTPATIENT)
Dept: INTERNAL MEDICINE CLINIC | Facility: CLINIC | Age: 61
End: 2021-02-26

## 2021-02-26 VITALS
SYSTOLIC BLOOD PRESSURE: 134 MMHG | DIASTOLIC BLOOD PRESSURE: 62 MMHG | RESPIRATION RATE: 18 BRPM | BODY MASS INDEX: 26.04 KG/M2 | WEIGHT: 196.5 LBS | HEIGHT: 73 IN | HEART RATE: 94 BPM

## 2021-02-26 DIAGNOSIS — I10 ESSENTIAL HYPERTENSION: Primary | ICD-10-CM

## 2021-02-26 PROCEDURE — 99213 OFFICE O/P EST LOW 20 MIN: CPT | Performed by: INTERNAL MEDICINE

## 2021-02-26 PROCEDURE — 3075F SYST BP GE 130 - 139MM HG: CPT | Performed by: INTERNAL MEDICINE

## 2021-02-26 PROCEDURE — 3078F DIAST BP <80 MM HG: CPT | Performed by: INTERNAL MEDICINE

## 2021-02-26 PROCEDURE — 3008F BODY MASS INDEX DOCD: CPT | Performed by: INTERNAL MEDICINE

## 2021-02-26 NOTE — PATIENT INSTRUCTIONS
Your blood pressure today was well controlled at 134/62. Continue current medication. Follow-up visit in 6 months.

## 2021-02-26 NOTE — PROGRESS NOTES
Melinda Aceves is a 56year old male. Patient presents with:  HTN: f/u after having BP meds adjusted     HPI:   Fabi Wilkinson presents this afternoon for follow-up of hypertension. At his visit 1 month ago, dose of amlodipine was increased. He feels well.   Compl

## 2021-06-02 ENCOUNTER — APPOINTMENT (OUTPATIENT)
Dept: GENERAL RADIOLOGY | Age: 61
End: 2021-06-02
Attending: NURSE PRACTITIONER
Payer: COMMERCIAL

## 2021-06-02 ENCOUNTER — HOSPITAL ENCOUNTER (OUTPATIENT)
Age: 61
Discharge: HOME | End: 2021-06-02
Payer: COMMERCIAL

## 2021-06-02 VITALS
TEMPERATURE: 98 F | SYSTOLIC BLOOD PRESSURE: 146 MMHG | BODY MASS INDEX: 25.03 KG/M2 | OXYGEN SATURATION: 98 % | WEIGHT: 195 LBS | HEIGHT: 74 IN | DIASTOLIC BLOOD PRESSURE: 79 MMHG | HEART RATE: 98 BPM | RESPIRATION RATE: 18 BRPM

## 2021-06-02 DIAGNOSIS — M25.531 RIGHT WRIST PAIN: Primary | ICD-10-CM

## 2021-06-02 PROCEDURE — L3924 HFO WITHOUT JOINTS PRE OTS: HCPCS | Performed by: NURSE PRACTITIONER

## 2021-06-02 PROCEDURE — 73110 X-RAY EXAM OF WRIST: CPT | Performed by: NURSE PRACTITIONER

## 2021-06-02 PROCEDURE — 99213 OFFICE O/P EST LOW 20 MIN: CPT | Performed by: NURSE PRACTITIONER

## 2021-06-02 RX ORDER — NAPROXEN SODIUM 275 MG/1
275 TABLET ORAL 2 TIMES DAILY WITH MEALS
Qty: 20 TABLET | Refills: 0 | Status: SHIPPED | OUTPATIENT
Start: 2021-06-02 | End: 2021-06-17 | Stop reason: ALTCHOICE

## 2021-06-02 NOTE — ED QUICK NOTES
Wrist splint applied with care instruction has a appointment with md 6/17, will keep that unless needs to see him benitez

## 2021-06-02 NOTE — ED INITIAL ASSESSMENT (HPI)
Pt here to IC with c/o right wrist pain that started 1 week ago. Area is slightly swollen. 2+ radial pulse. CMS intact.

## 2021-06-02 NOTE — ED PROVIDER NOTES
Patient Seen in: [facility]      History   No chief complaint on file. Stated Complaint: rt wrist pain     HPI/Subjective:   61yo male to ED with right wrist pain after cutting bushes and doing yard work all weekend.  Hx arthritis- worked a Constitutional:       General: He is not in acute distress. Appearance: Normal appearance. He is normal weight. He is not ill-appearing or toxic-appearing. HENT:      Head: Normocephalic.       Nose: Nose normal.      Mouth/Throat:      Mouth: Mucou injury. SOFT TISSUES: Appear to be some dystrophic calcifications in the ulnar     soft tissues. EFFUSION: None visible. OTHER: Negative.                         =====    CONCLUSION:          No acute fracture or dislocation.          Advanced sca

## 2021-06-16 ENCOUNTER — TELEPHONE (OUTPATIENT)
Dept: INTERNAL MEDICINE CLINIC | Facility: CLINIC | Age: 61
End: 2021-06-16

## 2021-06-16 DIAGNOSIS — M79.643 PAIN OF HAND, UNSPECIFIED LATERALITY: Primary | ICD-10-CM

## 2021-06-17 ENCOUNTER — OFFICE VISIT (OUTPATIENT)
Dept: SURGERY | Facility: CLINIC | Age: 61
End: 2021-06-17

## 2021-06-17 DIAGNOSIS — M67.431 GANGLION OF RIGHT WRIST: Primary | ICD-10-CM

## 2021-06-17 PROCEDURE — 99243 OFF/OP CNSLTJ NEW/EST LOW 30: CPT | Performed by: PLASTIC SURGERY

## 2021-06-17 NOTE — H&P
Josh Ruiz is a 56year old male that presents with Patient presents with:  Pain: Right volar radial wrist      REFERRED BY:  Jalen Andrade    Pacemaker: No  Latex Allergy: no  Coumadin: No  Plavix: No  Other anticoagulants: No  Cardiac stents: No    HA Smoking status: Current Every Day Smoker        Packs/day: 1.00        Years: 35.00        Pack years: 28        Types: Cigarettes      Smokeless tobacco: Never Used      Tobacco comment: started at age 20    Vaping Use      Vaping Use: Never used    [place]

## 2021-07-14 RX ORDER — AMLODIPINE BESYLATE 5 MG/1
5 TABLET ORAL DAILY
Qty: 90 TABLET | Refills: 0 | Status: SHIPPED | OUTPATIENT
Start: 2021-07-14 | End: 2021-09-17

## 2021-08-07 ENCOUNTER — TELEPHONE (OUTPATIENT)
Dept: INTERNAL MEDICINE CLINIC | Facility: CLINIC | Age: 61
End: 2021-08-07

## 2021-08-07 DIAGNOSIS — M67.431 GANGLION CYST OF WRIST, RIGHT: Primary | ICD-10-CM

## 2021-08-07 NOTE — TELEPHONE ENCOUNTER
Advised patient of Dr Orville Ozuna note. Patient verbalized understanding and had no further questions.  Provided the # to call Dr. Aric Gaston

## 2021-08-07 NOTE — TELEPHONE ENCOUNTER
Patient seen in the office of Dr. Rolando Mejía on 6/17/21 for right wrist ganglion cyst. He was told if it becomes more bothersome to see physiatry. See office note: We discussed what a ganglion/mass is, including treatment options.   Questions were answer

## 2021-09-01 ENCOUNTER — OFFICE VISIT (OUTPATIENT)
Dept: PHYSICAL MEDICINE AND REHAB | Facility: CLINIC | Age: 61
End: 2021-09-01

## 2021-09-01 VITALS
SYSTOLIC BLOOD PRESSURE: 146 MMHG | HEART RATE: 108 BPM | BODY MASS INDEX: 25.84 KG/M2 | WEIGHT: 195 LBS | HEIGHT: 73 IN | OXYGEN SATURATION: 97 % | DIASTOLIC BLOOD PRESSURE: 78 MMHG

## 2021-09-01 DIAGNOSIS — M67.431 GANGLION CYST OF VOLAR ASPECT OF RIGHT WRIST: Primary | ICD-10-CM

## 2021-09-01 PROCEDURE — 3077F SYST BP >= 140 MM HG: CPT | Performed by: PHYSICAL MEDICINE & REHABILITATION

## 2021-09-01 PROCEDURE — 3078F DIAST BP <80 MM HG: CPT | Performed by: PHYSICAL MEDICINE & REHABILITATION

## 2021-09-01 PROCEDURE — 99243 OFF/OP CNSLTJ NEW/EST LOW 30: CPT | Performed by: PHYSICAL MEDICINE & REHABILITATION

## 2021-09-01 PROCEDURE — 3008F BODY MASS INDEX DOCD: CPT | Performed by: PHYSICAL MEDICINE & REHABILITATION

## 2021-09-01 NOTE — PROGRESS NOTES
[address]  Progress Note    CHIEF COMPLAINT:  Patient presents with:  Hand Pain: New right handed pt comes with right wrist pain. Was told he has Ganglion Referred by Dr. Yulisa Mata. Went to .  States icing help Patient-reported ROS  Constitutional  Sleep Disturbance: denies  Chills: denies  Fever: denies  Weight Gain: denies  Weight Loss: denies   Cardiovascular  Chest Pain: denies  Irregular Heartbeat: denies   Respiratory  Painful Breathing: denies  Wheezing: it needs further treatment. Ganglia explained including benign nature. Patient appreciative. RTC:    No follow-ups on file. Discharge Instructions were provided as documented in AVS summary.   The patient was in agreement with the assessment an

## 2021-09-02 PROBLEM — M67.431 GANGLION CYST OF VOLAR ASPECT OF RIGHT WRIST: Status: ACTIVE | Noted: 2021-09-02

## 2021-09-07 DIAGNOSIS — I10 ESSENTIAL HYPERTENSION: ICD-10-CM

## 2021-09-07 RX ORDER — LISINOPRIL AND HYDROCHLOROTHIAZIDE 20; 12.5 MG/1; MG/1
1 TABLET ORAL DAILY
Qty: 90 TABLET | Refills: 0 | Status: SHIPPED | OUTPATIENT
Start: 2021-09-07 | End: 2021-09-25

## 2021-09-17 RX ORDER — AMLODIPINE BESYLATE 5 MG/1
TABLET ORAL
Qty: 90 TABLET | Refills: 0 | Status: SHIPPED | OUTPATIENT
Start: 2021-09-17 | End: 2021-09-25

## 2021-09-17 NOTE — TELEPHONE ENCOUNTER
Spoke, with the patient and informed him of the message below. Pt states that he will have his wife call back to schedule an appointment.

## 2021-09-23 ENCOUNTER — APPOINTMENT (OUTPATIENT)
Dept: CV DIAGNOSTICS | Facility: HOSPITAL | Age: 61
End: 2021-09-23
Attending: HOSPITALIST
Payer: COMMERCIAL

## 2021-09-23 ENCOUNTER — HOSPITAL ENCOUNTER (OUTPATIENT)
Facility: HOSPITAL | Age: 61
Setting detail: OBSERVATION
Discharge: HOME | End: 2021-09-25
Attending: EMERGENCY MEDICINE | Admitting: HOSPITALIST
Payer: COMMERCIAL

## 2021-09-23 ENCOUNTER — APPOINTMENT (OUTPATIENT)
Dept: GENERAL RADIOLOGY | Facility: HOSPITAL | Age: 61
End: 2021-09-23
Attending: EMERGENCY MEDICINE
Payer: COMMERCIAL

## 2021-09-23 DIAGNOSIS — I44.7 LEFT BUNDLE BRANCH BLOCK: ICD-10-CM

## 2021-09-23 DIAGNOSIS — R00.2 PALPITATIONS: Primary | ICD-10-CM

## 2021-09-23 DIAGNOSIS — I42.0 DILATED CARDIOMYOPATHY (HCC): ICD-10-CM

## 2021-09-23 LAB
ANION GAP SERPL CALC-SCNC: 9 MMOL/L (ref 0–18)
BASOPHILS # BLD AUTO: 0.07 X10(3) UL (ref 0–0.2)
BASOPHILS NFR BLD AUTO: 1 %
BUN BLD-MCNC: 18 MG/DL (ref 7–18)
BUN/CREAT SERPL: 15.8 (ref 10–20)
C DIFF TOX B STL QL: NEGATIVE
CALCIUM BLD-MCNC: 8.9 MG/DL (ref 8.5–10.1)
CHLORIDE SERPL-SCNC: 102 MMOL/L (ref 98–112)
CO2 SERPL-SCNC: 23 MMOL/L (ref 21–32)
CREAT BLD-MCNC: 1.14 MG/DL
DEPRECATED RDW RBC AUTO: 44.5 FL (ref 35.1–46.3)
EOSINOPHIL # BLD AUTO: 0.24 X10(3) UL (ref 0–0.7)
EOSINOPHIL NFR BLD AUTO: 3.4 %
ERYTHROCYTE [DISTWIDTH] IN BLOOD BY AUTOMATED COUNT: 12.7 % (ref 11–15)
GLUCOSE BLD-MCNC: 113 MG/DL (ref 70–99)
HCT VFR BLD AUTO: 42.3 %
HGB BLD-MCNC: 14.4 G/DL
IMM GRANULOCYTES # BLD AUTO: 0.04 X10(3) UL (ref 0–1)
IMM GRANULOCYTES NFR BLD: 0.6 %
LYMPHOCYTES # BLD AUTO: 2 X10(3) UL (ref 1–4)
LYMPHOCYTES NFR BLD AUTO: 28.6 %
MAGNESIUM SERPL-MCNC: 2 MG/DL (ref 1.6–2.6)
MCH RBC QN AUTO: 32.4 PG (ref 26–34)
MCHC RBC AUTO-ENTMCNC: 34 G/DL (ref 31–37)
MCV RBC AUTO: 95.1 FL
MONOCYTES # BLD AUTO: 0.6 X10(3) UL (ref 0.1–1)
MONOCYTES NFR BLD AUTO: 8.6 %
NEUTROPHILS # BLD AUTO: 4.05 X10 (3) UL (ref 1.5–7.7)
NEUTROPHILS # BLD AUTO: 4.05 X10(3) UL (ref 1.5–7.7)
NEUTROPHILS NFR BLD AUTO: 57.8 %
OSMOLALITY SERPL CALC.SUM OF ELEC: 281 MOSM/KG (ref 275–295)
PLATELET # BLD AUTO: 242 10(3)UL (ref 150–450)
POTASSIUM SERPL-SCNC: 3.4 MMOL/L (ref 3.5–5.1)
RBC # BLD AUTO: 4.45 X10(6)UL
SARS-COV-2 RNA RESP QL NAA+PROBE: NOT DETECTED
SODIUM SERPL-SCNC: 134 MMOL/L (ref 136–145)
TROPONIN I SERPL-MCNC: <0.045 NG/ML (ref ?–0.04)
TSI SER-ACNC: 2.14 MIU/ML (ref 0.36–3.74)
WBC # BLD AUTO: 7 X10(3) UL (ref 4–11)

## 2021-09-23 PROCEDURE — 71045 X-RAY EXAM CHEST 1 VIEW: CPT | Performed by: EMERGENCY MEDICINE

## 2021-09-23 PROCEDURE — 93306 TTE W/DOPPLER COMPLETE: CPT | Performed by: HOSPITALIST

## 2021-09-23 PROCEDURE — 99220 INITIAL OBSERVATION CARE,LEVL III: CPT | Performed by: HOSPITALIST

## 2021-09-23 RX ORDER — CHLORHEXIDINE GLUCONATE 4 G/100ML
30 SOLUTION TOPICAL
Status: COMPLETED | OUTPATIENT
Start: 2021-09-24 | End: 2021-09-24

## 2021-09-23 RX ORDER — AMLODIPINE BESYLATE 5 MG/1
5 TABLET ORAL DAILY
Status: DISCONTINUED | OUTPATIENT
Start: 2021-09-23 | End: 2021-09-24

## 2021-09-23 RX ORDER — METOPROLOL TARTRATE 5 MG/5ML
5 INJECTION INTRAVENOUS SEE ADMIN INSTRUCTIONS
Status: DISCONTINUED | OUTPATIENT
Start: 2021-09-23 | End: 2021-09-25

## 2021-09-23 RX ORDER — LISINOPRIL AND HYDROCHLOROTHIAZIDE 20; 12.5 MG/1; MG/1
1 TABLET ORAL DAILY
Status: DISCONTINUED | OUTPATIENT
Start: 2021-09-23 | End: 2021-09-23

## 2021-09-23 RX ORDER — ACETAMINOPHEN 325 MG/1
650 TABLET ORAL EVERY 6 HOURS PRN
Status: DISCONTINUED | OUTPATIENT
Start: 2021-09-23 | End: 2021-09-25

## 2021-09-23 RX ORDER — METOPROLOL TARTRATE 50 MG/1
50 TABLET, FILM COATED ORAL ONCE AS NEEDED
Status: COMPLETED | OUTPATIENT
Start: 2021-09-23 | End: 2021-09-23

## 2021-09-23 RX ORDER — PROCHLORPERAZINE EDISYLATE 5 MG/ML
5 INJECTION INTRAMUSCULAR; INTRAVENOUS EVERY 8 HOURS PRN
Status: DISCONTINUED | OUTPATIENT
Start: 2021-09-23 | End: 2021-09-25

## 2021-09-23 RX ORDER — DILTIAZEM HYDROCHLORIDE 5 MG/ML
INJECTION INTRAVENOUS
Status: DISPENSED
Start: 2021-09-23 | End: 2021-09-24

## 2021-09-23 RX ORDER — METOPROLOL TARTRATE 100 MG/1
100 TABLET ORAL ONCE
Status: DISCONTINUED | OUTPATIENT
Start: 2021-09-24 | End: 2021-09-23

## 2021-09-23 RX ORDER — METOPROLOL TARTRATE 5 MG/5ML
INJECTION INTRAVENOUS
Status: COMPLETED
Start: 2021-09-23 | End: 2021-09-23

## 2021-09-23 RX ORDER — ONDANSETRON 2 MG/ML
4 INJECTION INTRAMUSCULAR; INTRAVENOUS EVERY 6 HOURS PRN
Status: DISCONTINUED | OUTPATIENT
Start: 2021-09-23 | End: 2021-09-25

## 2021-09-23 RX ORDER — TEMAZEPAM 15 MG/1
15 CAPSULE ORAL NIGHTLY PRN
Status: DISCONTINUED | OUTPATIENT
Start: 2021-09-23 | End: 2021-09-25

## 2021-09-23 RX ORDER — POTASSIUM CHLORIDE 20 MEQ/1
40 TABLET, EXTENDED RELEASE ORAL ONCE
Status: COMPLETED | OUTPATIENT
Start: 2021-09-23 | End: 2021-09-23

## 2021-09-23 RX ORDER — ASPIRIN 325 MG
325 TABLET ORAL DAILY
Status: DISCONTINUED | OUTPATIENT
Start: 2021-09-23 | End: 2021-09-24

## 2021-09-23 RX ORDER — NITROGLYCERIN 0.4 MG/1
0.4 TABLET SUBLINGUAL EVERY 5 MIN PRN
Status: DISCONTINUED | OUTPATIENT
Start: 2021-09-23 | End: 2021-09-25

## 2021-09-23 RX ORDER — METOPROLOL TARTRATE 50 MG/1
50 TABLET, FILM COATED ORAL ONCE
Status: COMPLETED | OUTPATIENT
Start: 2021-09-23 | End: 2021-09-23

## 2021-09-23 RX ORDER — METOPROLOL TARTRATE 50 MG/1
50 TABLET, FILM COATED ORAL ONCE
Status: DISCONTINUED | OUTPATIENT
Start: 2021-09-23 | End: 2021-09-23

## 2021-09-23 RX ORDER — METOPROLOL TARTRATE 100 MG/1
100 TABLET ORAL ONCE AS NEEDED
Status: COMPLETED | OUTPATIENT
Start: 2021-09-23 | End: 2021-09-23

## 2021-09-23 RX ORDER — HEPARIN SODIUM 5000 [USP'U]/ML
5000 INJECTION, SOLUTION INTRAVENOUS; SUBCUTANEOUS EVERY 12 HOURS SCHEDULED
Status: DISCONTINUED | OUTPATIENT
Start: 2021-09-23 | End: 2021-09-25

## 2021-09-23 RX ORDER — SODIUM CHLORIDE 9 MG/ML
INJECTION, SOLUTION INTRAVENOUS
Status: ACTIVE | OUTPATIENT
Start: 2021-09-24 | End: 2021-09-24

## 2021-09-23 RX ORDER — DILTIAZEM HYDROCHLORIDE 5 MG/ML
5 INJECTION INTRAVENOUS SEE ADMIN INSTRUCTIONS
Status: DISCONTINUED | OUTPATIENT
Start: 2021-09-23 | End: 2021-09-25

## 2021-09-23 RX ORDER — NITROGLYCERIN 0.4 MG/1
0.4 TABLET SUBLINGUAL ONCE
Status: DISCONTINUED | OUTPATIENT
Start: 2021-09-23 | End: 2021-09-25

## 2021-09-23 RX ORDER — METOPROLOL TARTRATE 100 MG/1
100 TABLET ORAL ONCE
Status: DISCONTINUED | OUTPATIENT
Start: 2021-09-23 | End: 2021-09-23

## 2021-09-23 RX ORDER — METOPROLOL TARTRATE 50 MG/1
50 TABLET, FILM COATED ORAL ONCE
Status: DISCONTINUED | OUTPATIENT
Start: 2021-09-24 | End: 2021-09-23

## 2021-09-23 NOTE — PLAN OF CARE
Patient alert and oriented x4, saturating well on room air. Echo done today. CT of coronary arteries attempted but unable to lower heart rate enough. Plan for cath tomorrow morning, consent signed and in the chart.   Educated to use the call light, withi balance, assess for edema, trend weights  Outcome: Progressing  Goal: Absence of cardiac arrhythmias or at baseline  Description: INTERVENTIONS:  - Continuous cardiac monitoring, monitor vital signs, obtain 12 lead EKG if indicated  - Evaluate effectivenes

## 2021-09-23 NOTE — ED INITIAL ASSESSMENT (HPI)
Pt from home via EMS for complaints of palpitations starting today. Reports diarrhea yesterday. Denies chest pain. 324mg aspirin given by medics.

## 2021-09-23 NOTE — PROGRESS NOTES
Cardiology follow-up    Reviewed echo. Patient has severe left ventricular dysfunction well out of proportion to his left bundle branch block. I saw him in CT where they had given him large amounts of metoprolol and diltiazem.   He did admit to being a

## 2021-09-23 NOTE — ED PROVIDER NOTES
Patient Seen in: [facility] AND [facility] Emergency Department      History   Patient presents with:  Arrythmia/Palpitations    Stated Complaint: palpitations    Subjective:   HPI    68-year-old male with history of hypertension, hyperlipidemia, here with compl palpitations  Other systems are as noted in HPI. Constitutional and vital signs reviewed. All other systems reviewed and negative except as noted above.     Physical Exam     ED Triage Vitals [09/23/21 0843]   BP (!) 165/88   Pulse 84   Resp 16   Temp Value Ref Range    Hold Lt Green Auto Resulted    RAINBOW DRAW GOLD    Collection Time: 09/23/21  8:50 AM   Result Value Ref Range    Hold Gold Auto Resulted    Magnesium    Collection Time: 09/23/21  8:50 AM   Result Value Ref Range    Magnesium 2.0 1.6 - acute disease in the chest.    Dictated by (CST): Amilcar Shay MD on 9/23/2021 at 8:55 AM     Finalized by (CST): Amilcar Shay MD on 9/23/2021 at 8:56 AM              EMERGENCY DEPARTMENT COURSE AND TREATMENT:  Patient's condition was stable during Noemy patient's history, performing the physical exam and reviewing the diagnostics, multiple initial diagnoses were considered based on the presenting problem including arrhythmia, anxiety attack, ACS.                            Disposition and Plan     Clinical

## 2021-09-23 NOTE — CONSULTS
[facility] NOTE      Patient Name: Maricel Pérez MRN: [id number]   : 1960 CSN: [id number] nerve injury     Family History   Problem Relation Age of Onset   • Other (Stomach Cancer [Other]) Father          age 52   • Hypertension Maternal Grandmother    • Hypertension Maternal Aunt    • Hypertension Maternal Uncle        SHX:  The patient  r 09/23/2021    CO2 23.0 09/23/2021     (H) 09/23/2021    CA 8.9 09/23/2021    ALB 3.8 01/22/2021    ALKPHO 70 01/22/2021    BILT 0.5 01/22/2021    TP 7.5 01/22/2021    AST 24 01/22/2021    ALT 43 01/22/2021    TSH 2.140 09/23/2021    PSA 0.8 11/09/20

## 2021-09-23 NOTE — H&P
[facility]    PATIENT'S NAME: Millie Carlos   ATTENDING PHYSICIAN: Ellen Roth MD   PATIENT ACCOUNT#:   [de-identified]    LOCATION:  [place] [address]  MEDICAL RECORD #:   [id number]       YOB: 1960  ADMISSION DATE:       09/23/202 he drinks 2 beers on a daily basis. Other 12-point review of systems is negative. PHYSICAL EXAMINATION:    GENERAL:  Alert and oriented to time, place, and person. No acute distress.     VITAL SIGNS:  Temperature 98.9, pulse 79, respiratory rate 1

## 2021-09-23 NOTE — ED QUICK NOTES
Orders for admission, patient is aware of plan and ready to go upstairs. Any questions, please call ED JOSEFINA leung at extension [phone].      Type of COVID test sent: Rapid pending  COVID Suspicion level: Low    LOC at time of transport: A/O x 4    Other pertinen

## 2021-09-23 NOTE — IMAGING NOTE
TO RAD HOLDING AT 2431    HX TAKEN :\"PALPATATIONS AND CLAMMINESS\"    PT CONSENTED AT 4324 per Floor RN     CTA ORDERED BY CAYLA  WAS PT GIVEN CTA  PREMEDS NO    METOPROLOL PO GIVEN 100 MILLIGRAMS  AT 1255 PER FLOOR RN    METOPROLOL PO GIVEN 50 MILLIGR

## 2021-09-24 ENCOUNTER — APPOINTMENT (OUTPATIENT)
Dept: INTERVENTIONAL RADIOLOGY/VASCULAR | Facility: HOSPITAL | Age: 61
End: 2021-09-24
Attending: INTERNAL MEDICINE
Payer: COMMERCIAL

## 2021-09-24 LAB
ANION GAP SERPL CALC-SCNC: 8 MMOL/L (ref 0–18)
BASOPHILS # BLD AUTO: 0.05 X10(3) UL (ref 0–0.2)
BASOPHILS NFR BLD AUTO: 0.8 %
BUN BLD-MCNC: 14 MG/DL (ref 7–18)
BUN/CREAT SERPL: 15.7 (ref 10–20)
CALCIUM BLD-MCNC: 8.7 MG/DL (ref 8.5–10.1)
CHLORIDE SERPL-SCNC: 107 MMOL/L (ref 98–112)
CHOLEST SERPL-MCNC: 240 MG/DL (ref ?–200)
CO2 SERPL-SCNC: 23 MMOL/L (ref 21–32)
CREAT BLD-MCNC: 0.89 MG/DL
DEPRECATED RDW RBC AUTO: 46.3 FL (ref 35.1–46.3)
EOSINOPHIL # BLD AUTO: 0.19 X10(3) UL (ref 0–0.7)
EOSINOPHIL NFR BLD AUTO: 3 %
ERYTHROCYTE [DISTWIDTH] IN BLOOD BY AUTOMATED COUNT: 12.9 % (ref 11–15)
GLUCOSE BLD-MCNC: 104 MG/DL (ref 70–99)
HCT VFR BLD AUTO: 39.6 %
HDLC SERPL-MCNC: 38 MG/DL (ref 40–59)
HGB BLD-MCNC: 13.5 G/DL
IMM GRANULOCYTES # BLD AUTO: 0.03 X10(3) UL (ref 0–1)
IMM GRANULOCYTES NFR BLD: 0.5 %
LDLC SERPL CALC-MCNC: 131 MG/DL (ref ?–100)
LYMPHOCYTES # BLD AUTO: 1.52 X10(3) UL (ref 1–4)
LYMPHOCYTES NFR BLD AUTO: 24.4 %
MCH RBC QN AUTO: 33.1 PG (ref 26–34)
MCHC RBC AUTO-ENTMCNC: 34.1 G/DL (ref 31–37)
MCV RBC AUTO: 97.1 FL
MONOCYTES # BLD AUTO: 0.48 X10(3) UL (ref 0.1–1)
MONOCYTES NFR BLD AUTO: 7.7 %
NEUTROPHILS # BLD AUTO: 3.97 X10 (3) UL (ref 1.5–7.7)
NEUTROPHILS # BLD AUTO: 3.97 X10(3) UL (ref 1.5–7.7)
NEUTROPHILS NFR BLD AUTO: 63.6 %
NONHDLC SERPL-MCNC: 202 MG/DL (ref ?–130)
OSMOLALITY SERPL CALC.SUM OF ELEC: 287 MOSM/KG (ref 275–295)
PLATELET # BLD AUTO: 215 10(3)UL (ref 150–450)
POTASSIUM SERPL-SCNC: 4 MMOL/L (ref 3.5–5.1)
RBC # BLD AUTO: 4.08 X10(6)UL
SODIUM SERPL-SCNC: 138 MMOL/L (ref 136–145)
TRIGL SERPL-MCNC: 394 MG/DL (ref 30–149)
VLDLC SERPL CALC-MCNC: 74 MG/DL (ref 0–30)
WBC # BLD AUTO: 6.2 X10(3) UL (ref 4–11)

## 2021-09-24 PROCEDURE — B2151ZZ FLUOROSCOPY OF LEFT HEART USING LOW OSMOLAR CONTRAST: ICD-10-PCS | Performed by: INTERNAL MEDICINE

## 2021-09-24 PROCEDURE — B2111ZZ FLUOROSCOPY OF MULTIPLE CORONARY ARTERIES USING LOW OSMOLAR CONTRAST: ICD-10-PCS | Performed by: INTERNAL MEDICINE

## 2021-09-24 PROCEDURE — 99226 SUBSEQUENT OBSERVATION CARE: CPT | Performed by: HOSPITALIST

## 2021-09-24 PROCEDURE — 4A023N8 MEASUREMENT OF CARDIAC SAMPLING AND PRESSURE, BILATERAL, PERCUTANEOUS APPROACH: ICD-10-PCS | Performed by: INTERNAL MEDICINE

## 2021-09-24 RX ORDER — CARVEDILOL 6.25 MG/1
6.25 TABLET ORAL 2 TIMES DAILY WITH MEALS
Status: DISCONTINUED | OUTPATIENT
Start: 2021-09-24 | End: 2021-09-25

## 2021-09-24 RX ORDER — ASPIRIN 81 MG/1
81 TABLET, CHEWABLE ORAL DAILY
Status: DISCONTINUED | OUTPATIENT
Start: 2021-09-24 | End: 2021-09-25

## 2021-09-24 RX ORDER — LISINOPRIL 10 MG/1
10 TABLET ORAL DAILY
Status: DISCONTINUED | OUTPATIENT
Start: 2021-09-24 | End: 2021-09-24

## 2021-09-24 RX ORDER — ATORVASTATIN CALCIUM 40 MG/1
40 TABLET, FILM COATED ORAL NIGHTLY
Status: DISCONTINUED | OUTPATIENT
Start: 2021-09-24 | End: 2021-09-25

## 2021-09-24 RX ORDER — MIDAZOLAM HYDROCHLORIDE 1 MG/ML
INJECTION INTRAMUSCULAR; INTRAVENOUS
Status: COMPLETED
Start: 2021-09-24 | End: 2021-09-24

## 2021-09-24 RX ORDER — LIDOCAINE HYDROCHLORIDE 20 MG/ML
INJECTION, SOLUTION EPIDURAL; INFILTRATION; INTRACAUDAL; PERINEURAL
Status: COMPLETED
Start: 2021-09-24 | End: 2021-09-24

## 2021-09-24 NOTE — PROGRESS NOTES
[facility]     Hospitalist Progress Note     [facility] Patient Status:  Observation    1960 MRN [id number]   Location [facility] 3W/SW Attending Rufus Puga MD   Hosp Day # 0 PCP Lan Oneil MD     Chief Complaint 105   GFRNAA 68 91   CA 8.9 8.7   * 138   K 3.4* 4.0    107   CO2 23.0 23.0       Estimated Creatinine Clearance: 96 mL/min (based on SCr of 0.89 mg/dL). No results for input(s): PTP, INR in the last 168 hours.          Culture:  No results f Romina Shell MD  Hospitalist      Supplementary Documentation:     Quality:  · Diet: NPO  · PT/OT: deferred  · DVT Prophylaxis: heparin   · CODE status: Full.    · Dispo: per clinical course      Estimated date of discharge: 09/25/21  Discharge is dependent on:

## 2021-09-24 NOTE — PLAN OF CARE
Patient alert and oriented x4, saturating well on room air. Cath done today through the right groin, no intervention. Educated to use the call light, within reach. Plan for home tomorrow.      Problem: Patient Centered Care  Goal: Patient preferences are baseline  Description: INTERVENTIONS:  - Continuous cardiac monitoring, monitor vital signs, obtain 12 lead EKG if indicated  - Evaluate effectiveness of antiarrhythmic and heart rate control medications as ordered  - Initiate emergency measures for life t

## 2021-09-24 NOTE — CONSULTS
[address] NOTE      Patient Name: Jared Beach MRN: [id number]   : 1960 CSN: [id number] stools. Neurologic: No seizures, tremors, weakness or numbness.     Past Medical, Surgical, Family, and Social Histories:     Past Medical History:   Diagnosis Date   • Aortic atherosclerosis ([facility] [address]     CXR 1-18   • Essential hypertension 2010   • High bloo 09/24/2021    HCT 39.6 09/24/2021    .0 09/24/2021    CREATSERUM 0.89 09/24/2021    BUN 14 09/24/2021     09/24/2021    K 4.0 09/24/2021     09/24/2021    CO2 23.0 09/24/2021     (H) 09/24/2021    CA 8.7 09/24/2021    ALB 3.8 01/2

## 2021-09-24 NOTE — PROCEDURES
[facility] - [facility]    Cardiac Cath Procedure Note  Jumana Everett Patient Status:  Observation    1960 MRN [id number]   Location [facility] 3W/SW Attending Stuart Vega MD   Hosp Day # 0 PCP Elvia Domingo MD       Cardiologist: vein accessed by palpation. 7 FR sheath placed. Monitoring swan advanced to RA and pressures recorded in RA, RV, PA and wedge positions under fluoroscopic and hemodynamic guidance.       Selective coronary angiography performed with JR4 catheter for RCA a

## 2021-09-24 NOTE — PLAN OF CARE
Problem: Patient Centered Care  Goal: Patient preferences are identified and integrated in the patient's plan of care  Description: Interventions:  - What would you like us to know as we care for you? I retired six months ago.   - Provide timely, complete medications as ordered  - Initiate emergency measures for life threatening arrhythmias  - Monitor electrolytes and administer replacement therapy as ordered  Outcome: Progressing   Patient alert and oriented x4. VSS.  Plan for heart cath in AM. NPO since mi

## 2021-09-25 VITALS
TEMPERATURE: 98 F | HEART RATE: 84 BPM | OXYGEN SATURATION: 97 % | SYSTOLIC BLOOD PRESSURE: 120 MMHG | WEIGHT: 197.38 LBS | HEIGHT: 73 IN | RESPIRATION RATE: 14 BRPM | BODY MASS INDEX: 26.16 KG/M2 | DIASTOLIC BLOOD PRESSURE: 77 MMHG

## 2021-09-25 LAB
ANION GAP SERPL CALC-SCNC: 4 MMOL/L (ref 0–18)
BUN BLD-MCNC: 22 MG/DL (ref 7–18)
BUN/CREAT SERPL: 24.2 (ref 10–20)
CALCIUM BLD-MCNC: 8.7 MG/DL (ref 8.5–10.1)
CHLORIDE SERPL-SCNC: 109 MMOL/L (ref 98–112)
CO2 SERPL-SCNC: 25 MMOL/L (ref 21–32)
CREAT BLD-MCNC: 0.91 MG/DL
GLUCOSE BLD-MCNC: 110 MG/DL (ref 70–99)
OSMOLALITY SERPL CALC.SUM OF ELEC: 290 MOSM/KG (ref 275–295)
POTASSIUM SERPL-SCNC: 3.9 MMOL/L (ref 3.5–5.1)
SODIUM SERPL-SCNC: 138 MMOL/L (ref 136–145)

## 2021-09-25 PROCEDURE — 99217 OBSERVATION CARE DISCHARGE: CPT | Performed by: HOSPITALIST

## 2021-09-25 RX ORDER — LISINOPRIL 5 MG/1
5 TABLET ORAL DAILY
Status: DISCONTINUED | OUTPATIENT
Start: 2021-09-25 | End: 2021-09-25

## 2021-09-25 RX ORDER — LISINOPRIL 5 MG/1
5 TABLET ORAL DAILY
Qty: 30 TABLET | Refills: 3 | Status: SHIPPED | OUTPATIENT
Start: 2021-09-26 | End: 2021-09-25

## 2021-09-25 RX ORDER — CARVEDILOL 6.25 MG/1
6.25 TABLET ORAL 2 TIMES DAILY WITH MEALS
Qty: 60 TABLET | Refills: 3 | Status: SHIPPED | OUTPATIENT
Start: 2021-09-25

## 2021-09-25 RX ORDER — ASPIRIN 81 MG/1
81 TABLET, CHEWABLE ORAL DAILY
Qty: 30 TABLET | Refills: 0 | Status: SHIPPED | OUTPATIENT
Start: 2021-09-25

## 2021-09-25 RX ORDER — ATORVASTATIN CALCIUM 40 MG/1
40 TABLET, FILM COATED ORAL NIGHTLY
Qty: 30 TABLET | Refills: 3 | Status: SHIPPED | OUTPATIENT
Start: 2021-09-25

## 2021-09-25 RX ORDER — SACUBITRIL AND VALSARTAN 24; 26 MG/1; MG/1
1 TABLET, FILM COATED ORAL 2 TIMES DAILY
Qty: 60 TABLET | Refills: 0 | Status: SHIPPED | OUTPATIENT
Start: 2021-09-25 | End: 2021-10-25

## 2021-09-25 NOTE — PROGRESS NOTES
[address] PROGRESS NOTE      Lopez Sanchez Patient Status:  Observation    1960 MRN [id number]   Location [facility] 3W/SW Attending Raymond Caicedo MD   Hosp Day # 0 PCP Oral BID with meals   • aspirin  81 mg Oral Daily   • atorvastatin  40 mg Oral Nightly   • nitroGLYCERIN  0.4 mg Sublingual Once   • metoprolol Tartrate  5 mg Intravenous See Admin Instructions    Or   • dilTIAZem HCl  5 mg Intravenous See Admin Instructio

## 2021-09-25 NOTE — PLAN OF CARE
Problem: Patient Centered Care  Goal: Patient preferences are identified and integrated in the patient's plan of care  Description: Interventions:  - What would you like us to know as we care for you? I retired six months ago.   - Provide timely, complete medications as ordered  - Initiate emergency measures for life threatening arrhythmias  - Monitor electrolytes and administer replacement therapy as ordered  Outcome: Progressing   Patient a&o x4. Right groin dressing cdi. Will continue to monitor. Doppler bilateral

## 2021-09-25 NOTE — CM/SW NOTE
09/25/21 1300   Discharge disposition   Expected discharge disposition Home or Self   DME/Infusion Providers [employer]  (Life vest)   Discharge transportation Private car       CM notified by JOSEFINA Cerda that pt is stable for dc today and requests that I re

## 2021-09-25 NOTE — DISCHARGE SUMMARY
[facility] Hospitalist Discharge Summary   Patient ID:  Sushant Nip  [id number]  59year old  8/27/1960    Admit date: 9/23/2021  Discharge date: 9/25/2021  Primary Care Physician: Zeyad Jacobs MD   Attending Physician: Rj Tucker MD   Consults: Life vest on discharge      HTN   - hold home Amlodipine and hydrochlorothiazide/lisinopril.   - will start coreg      Tobacco abuse  - counseled on cessation           EXAM:   GENERAL: no apparent distress, comfortable  NEURO: A/A Ox3, no focal deficits lisinopril. Start taking entresto on Monday morning       Discharge References/Attachments    Cardiac Catheterization, Discharge Instructions for (English)           Important follow up:    Follow-up Information     Demetria Pascual MD. Schedule an appoint

## 2021-09-25 NOTE — PLAN OF CARE
Patient alert and oriented x4, saturating well on room air. Cath done today through the right groin, no intervention, perclose. Site was unremarkable. Per cardiology LifeVest at discharge? Educated to use the call light, within reach.   Ibed and non sli Evaluate effectiveness of vasoactive medications to optimize hemodynamic stability  - Monitor arterial and/or venous puncture sites for bleeding and/or hematoma  - Assess quality of pulses, skin color and temperature  - Assess for signs of decreased corona

## 2021-09-25 NOTE — PLAN OF CARE
Problem: Patient Centered Care  Goal: Patient preferences are identified and integrated in the patient's plan of care  Description: Interventions:  - What would you like us to know as we care for you? I retired six months ago.   - Provide timely, complete ordered  - Initiate emergency measures for life threatening arrhythmias  - Monitor electrolytes and administer replacement therapy as ordered  Outcome: Completed   Reviewed discharge instructions with patient.  Patient stated he understood and read back the

## 2021-09-27 ENCOUNTER — PATIENT OUTREACH (OUTPATIENT)
Dept: CASE MANAGEMENT | Age: 61
End: 2021-09-27

## 2021-09-27 ENCOUNTER — TELEPHONE (OUTPATIENT)
Dept: INTERNAL MEDICINE CLINIC | Facility: CLINIC | Age: 61
End: 2021-09-27

## 2021-09-27 DIAGNOSIS — I44.7 LEFT BUNDLE BRANCH BLOCK: ICD-10-CM

## 2021-09-27 DIAGNOSIS — Z02.9 ENCOUNTERS FOR UNSPECIFIED ADMINISTRATIVE PURPOSE: ICD-10-CM

## 2021-09-27 PROCEDURE — 1111F DSCHRG MED/CURRENT MED MERGE: CPT

## 2021-09-27 NOTE — PROGRESS NOTES
[address] [place] [address]    I spoke to Man from [employer] to confirm Esaw Sermon was placed. She states he declined vest understanding risks. Has outpt followup scheduled.     Juan Arrington NP

## 2021-09-27 NOTE — TELEPHONE ENCOUNTER
Spoke to pt for TCM today. Pt has TCM/HFU scheduled for 10/5/21. TCM/HFU appt recommended by 10/2/21 as pt is a high risk for readmission. Patient declines sooner appointment, would like to follow-up after cardiology.   Cardiology appointment is schedule

## 2021-09-27 NOTE — PROGRESS NOTES
Initial Post Discharge Follow Up   Discharge Date: 9/25/21  Contact Date: 9/27/2021    Consent Verification:  Assessment Completed With: Patient  HIPAA Verified?   Yes    Discharge Dx:     Palpitations   LBBB - new onset probably   New dilated cardiomyop 1 tablet by mouth 2 (two) times daily.  60 tablet 0     • (NCM)  Were there any medication changes noted on AVS?  yes  o If so, were these medication changes discussed with you prior to leaving the hospital? yes  • (NCM) If a new medication was prescribed: reviewed, declines sooner appointment. TE sent to PCP. Have you made all of your follow up appointments? yes    Is there any reason as to why you cannot make your appointments?    No     NCM Reviewed upcoming Specialist Appt with patient     Yes, lai

## 2021-10-02 PROBLEM — I42.0 DILATED CARDIOMYOPATHY (HCC): Status: ACTIVE | Noted: 2021-09-01

## 2021-10-05 ENCOUNTER — OFFICE VISIT (OUTPATIENT)
Dept: INTERNAL MEDICINE CLINIC | Facility: CLINIC | Age: 61
End: 2021-10-05

## 2021-10-05 VITALS
BODY MASS INDEX: 26.03 KG/M2 | HEART RATE: 73 BPM | DIASTOLIC BLOOD PRESSURE: 66 MMHG | SYSTOLIC BLOOD PRESSURE: 132 MMHG | HEIGHT: 73 IN | WEIGHT: 196.38 LBS

## 2021-10-05 DIAGNOSIS — I42.0 DILATED CARDIOMYOPATHY (HCC): Primary | ICD-10-CM

## 2021-10-05 DIAGNOSIS — I10 ESSENTIAL HYPERTENSION: ICD-10-CM

## 2021-10-05 PROCEDURE — 99495 TRANSJ CARE MGMT MOD F2F 14D: CPT | Performed by: INTERNAL MEDICINE

## 2021-10-05 PROCEDURE — 3078F DIAST BP <80 MM HG: CPT | Performed by: INTERNAL MEDICINE

## 2021-10-05 PROCEDURE — 3075F SYST BP GE 130 - 139MM HG: CPT | Performed by: INTERNAL MEDICINE

## 2021-10-05 PROCEDURE — 3008F BODY MASS INDEX DOCD: CPT | Performed by: INTERNAL MEDICINE

## 2021-10-05 NOTE — PROGRESS NOTES
HPI:    Ramin Negron is a 59year old male here today for hospital follow up.    He was discharged from Inpatient hospital[facility]  to Home   Admission Date: 9/23/21   Discharge Date: 9/25/21  Hospital Discharge Diagnoses (since 9/5/2021)     [facility] amlodipine were stopped. Entresto carvedilol atorvastatin and aspirin were begun. He was discharged home on September 25 with recommendations to obtain a LifeVest.    He presents today for follow-up, feeling well.   He saw Dr. Bertram Saini of Cardiology yesterday cigarettes. He has a 35.00 pack-year smoking history. He has never used smokeless tobacco. He reports current alcohol use of about 3.0 - 4.0 standard drinks of alcohol per week. He reports that he does not use drugs.        PHYSICAL EXAM:   No LMP for male · Number of Possible Diagnoses and/or Management Options: moderate  · Amount and/or Complexity of Data to Be Reviewed: moderate  · Risk of Significant Complications, Morbidity, and/or Mortality: moderate    Overall Risk:   moderate    Patient seen within

## 2021-10-05 NOTE — PATIENT INSTRUCTIONS
Continue current medications. Follow-up as planned with Dr. Fani Pete. Please schedule a physical with me in January. You should get the Covid vaccine as soon as possible and an annual influenza vaccine.

## 2021-10-07 ENCOUNTER — TELEPHONE (OUTPATIENT)
Dept: INTERNAL MEDICINE CLINIC | Facility: CLINIC | Age: 61
End: 2021-10-07

## 2021-10-07 RX ORDER — CARVEDILOL 12.5 MG/1
12.5 TABLET ORAL 2 TIMES DAILY WITH MEALS
Qty: 180 TABLET | Refills: 0 | COMMUNITY
Start: 2021-10-07

## 2021-10-07 NOTE — TELEPHONE ENCOUNTER
Pt states that Dr. Amanda Joseph increased his carvedilol to 12.5 BID at his hospital follow up. Will update chart. Pt would like to know if any of his medications would cause him to have increased urination.   He states that since the increased carvedilol, he is

## 2021-10-07 NOTE — TELEPHONE ENCOUNTER
Informed patient of advice per Dr. Bridget Rosario. Informed to monitor for the next week or so and to call back if he continues to have frequent urination. Patient denies dysuria, hematuria, lower back pain, abdominal pain, fever.

## 2021-10-13 ENCOUNTER — TELEPHONE (OUTPATIENT)
Dept: INTERNAL MEDICINE CLINIC | Facility: CLINIC | Age: 61
End: 2021-10-13

## 2021-10-13 DIAGNOSIS — Z12.11 COLON CANCER SCREENING: Primary | ICD-10-CM

## 2021-10-18 RX ORDER — LORATADINE 10 MG
TABLET ORAL
Qty: 30 TABLET | Refills: 0 | OUTPATIENT
Start: 2021-10-18

## 2021-11-03 ENCOUNTER — PATIENT OUTREACH (OUTPATIENT)
Dept: CASE MANAGEMENT | Age: 61
End: 2021-11-03

## 2021-11-03 NOTE — PROGRESS NOTES
Chart reviewed. Patient had HFU with PCP. Patient also reports having follow-up with cardiology. No needs for this NCM to address at this time. Will follow-up. DISPLAY PLAN FREE TEXT

## 2021-11-04 ENCOUNTER — TELEPHONE (OUTPATIENT)
Dept: INTERNAL MEDICINE CLINIC | Facility: CLINIC | Age: 61
End: 2021-11-04

## 2021-11-04 NOTE — TELEPHONE ENCOUNTER
Okay to continue to take OTC Nexium 20 mg daily, although he can stop it at some point to see if symptoms of heartburn recur.

## 2021-11-04 NOTE — TELEPHONE ENCOUNTER
Patient states he has been taking Nexium 20 mg over the counter daily and the bottle says to contact your doctor if you need to continue after 14 days. Patient states it has been helping with his heartburn, and since taking it heartburn is gone.      Ple

## 2021-11-04 NOTE — TELEPHONE ENCOUNTER
Verified pt name and . Reviewed doctor's recommendations as noted below. Pt agreed with plan of care, states he also will discuss further at his next OV, needs to schedule appt for px 2022.

## 2021-12-07 ENCOUNTER — PATIENT OUTREACH (OUTPATIENT)
Dept: CASE MANAGEMENT | Age: 61
End: 2021-12-07

## 2021-12-07 NOTE — PROGRESS NOTES
Chart reviewed. Patient was advised at last visit to follow-up in 3 month. Patient is due for visit in January. Patient also has been in contact with PCP office regarding medications. No needs for NCM to address at this time. Will follow-up.

## 2022-04-06 ENCOUNTER — TELEPHONE (OUTPATIENT)
Dept: CASE MANAGEMENT | Age: 62
End: 2022-04-06

## 2022-04-06 NOTE — TELEPHONE ENCOUNTER
Patient is due for colorectal screening. GI referral written 10/13/21. Discussed in detail w/patient. Patient declines to schedule GI consult but did schedule physical exam w/PCP.

## 2022-04-22 ENCOUNTER — OFFICE VISIT (OUTPATIENT)
Dept: INTERNAL MEDICINE CLINIC | Facility: CLINIC | Age: 62
End: 2022-04-22
Payer: COMMERCIAL

## 2022-04-22 ENCOUNTER — LAB ENCOUNTER (OUTPATIENT)
Dept: LAB | Facility: HOSPITAL | Age: 62
End: 2022-04-22
Attending: INTERNAL MEDICINE
Payer: COMMERCIAL

## 2022-04-22 ENCOUNTER — TELEPHONE (OUTPATIENT)
Dept: INTERNAL MEDICINE CLINIC | Facility: CLINIC | Age: 62
End: 2022-04-22

## 2022-04-22 VITALS
HEART RATE: 73 BPM | SYSTOLIC BLOOD PRESSURE: 132 MMHG | DIASTOLIC BLOOD PRESSURE: 72 MMHG | HEIGHT: 73 IN | WEIGHT: 204 LBS | BODY MASS INDEX: 27.04 KG/M2

## 2022-04-22 DIAGNOSIS — Z00.00 ANNUAL PHYSICAL EXAM: Primary | ICD-10-CM

## 2022-04-22 DIAGNOSIS — E78.00 HYPERCHOLESTEROLEMIA: ICD-10-CM

## 2022-04-22 DIAGNOSIS — I10 ESSENTIAL HYPERTENSION: ICD-10-CM

## 2022-04-22 DIAGNOSIS — Z00.00 ANNUAL PHYSICAL EXAM: ICD-10-CM

## 2022-04-22 DIAGNOSIS — I42.0 DILATED CARDIOMYOPATHY (HCC): ICD-10-CM

## 2022-04-22 DIAGNOSIS — I70.0 AORTIC ATHEROSCLEROSIS (HCC): ICD-10-CM

## 2022-04-22 DIAGNOSIS — N52.9 ERECTILE DYSFUNCTION, UNSPECIFIED ERECTILE DYSFUNCTION TYPE: ICD-10-CM

## 2022-04-22 DIAGNOSIS — Z72.0 TOBACCO USE: ICD-10-CM

## 2022-04-22 LAB
ALBUMIN SERPL-MCNC: 3.8 G/DL (ref 3.4–5)
ALBUMIN/GLOB SERPL: 1.3 {RATIO} (ref 1–2)
ALP LIVER SERPL-CCNC: 74 U/L
ALT SERPL-CCNC: 36 U/L
ANION GAP SERPL CALC-SCNC: 6 MMOL/L (ref 0–18)
AST SERPL-CCNC: 31 U/L (ref 15–37)
BILIRUB SERPL-MCNC: 0.7 MG/DL (ref 0.1–2)
BUN BLD-MCNC: 18 MG/DL (ref 7–18)
BUN/CREAT SERPL: 16.8 (ref 10–20)
CALCIUM BLD-MCNC: 9 MG/DL (ref 8.5–10.1)
CHLORIDE SERPL-SCNC: 107 MMOL/L (ref 98–112)
CHOLEST SERPL-MCNC: 160 MG/DL (ref ?–200)
CO2 SERPL-SCNC: 27 MMOL/L (ref 21–32)
COMPLEXED PSA SERPL-MCNC: 0.69 NG/ML (ref ?–4)
CREAT BLD-MCNC: 1.07 MG/DL
DEPRECATED RDW RBC AUTO: 49.9 FL (ref 35.1–46.3)
ERYTHROCYTE [DISTWIDTH] IN BLOOD BY AUTOMATED COUNT: 13.7 % (ref 11–15)
FASTING PATIENT LIPID ANSWER: YES
FASTING STATUS PATIENT QL REPORTED: YES
GLOBULIN PLAS-MCNC: 3 G/DL (ref 2.8–4.4)
GLUCOSE BLD-MCNC: 103 MG/DL (ref 70–99)
HCT VFR BLD AUTO: 43.7 %
HDLC SERPL-MCNC: 51 MG/DL (ref 40–59)
HGB BLD-MCNC: 14.6 G/DL
LDLC SERPL CALC-MCNC: 77 MG/DL (ref ?–100)
MCH RBC QN AUTO: 32.7 PG (ref 26–34)
MCHC RBC AUTO-ENTMCNC: 33.4 G/DL (ref 31–37)
MCV RBC AUTO: 98 FL
NONHDLC SERPL-MCNC: 109 MG/DL (ref ?–130)
OSMOLALITY SERPL CALC.SUM OF ELEC: 292 MOSM/KG (ref 275–295)
PLATELET # BLD AUTO: 207 10(3)UL (ref 150–450)
POTASSIUM SERPL-SCNC: 4.6 MMOL/L (ref 3.5–5.1)
PROT SERPL-MCNC: 6.8 G/DL (ref 6.4–8.2)
RBC # BLD AUTO: 4.46 X10(6)UL
SODIUM SERPL-SCNC: 140 MMOL/L (ref 136–145)
TRIGL SERPL-MCNC: 192 MG/DL (ref 30–149)
VLDLC SERPL CALC-MCNC: 30 MG/DL (ref 0–30)
WBC # BLD AUTO: 7 X10(3) UL (ref 4–11)

## 2022-04-22 PROCEDURE — 85027 COMPLETE CBC AUTOMATED: CPT

## 2022-04-22 PROCEDURE — 36415 COLL VENOUS BLD VENIPUNCTURE: CPT

## 2022-04-22 PROCEDURE — 3008F BODY MASS INDEX DOCD: CPT | Performed by: INTERNAL MEDICINE

## 2022-04-22 PROCEDURE — 99396 PREV VISIT EST AGE 40-64: CPT | Performed by: INTERNAL MEDICINE

## 2022-04-22 PROCEDURE — 80061 LIPID PANEL: CPT

## 2022-04-22 PROCEDURE — 80053 COMPREHEN METABOLIC PANEL: CPT

## 2022-04-22 PROCEDURE — 3075F SYST BP GE 130 - 139MM HG: CPT | Performed by: INTERNAL MEDICINE

## 2022-04-22 PROCEDURE — 3078F DIAST BP <80 MM HG: CPT | Performed by: INTERNAL MEDICINE

## 2022-04-22 RX ORDER — ESOMEPRAZOLE MAGNESIUM 40 MG/1
CAPSULE, DELAYED RELEASE ORAL
COMMUNITY
Start: 2021-10-29

## 2022-04-22 RX ORDER — SILDENAFIL 50 MG/1
50 TABLET, FILM COATED ORAL
Qty: 8 TABLET | Refills: 2 | Status: SHIPPED | OUTPATIENT
Start: 2022-04-22

## 2022-04-22 RX ORDER — SACUBITRIL AND VALSARTAN 24; 26 MG/1; MG/1
1 TABLET, FILM COATED ORAL 2 TIMES DAILY
COMMUNITY
Start: 2021-12-15

## 2022-04-22 NOTE — PATIENT INSTRUCTIONS
Your blood pressure today was good at 132/72 and your exam was normal.  Await results of today's blood tests. Please complete the stool test for colon cancer screening. Please completely abstain from tobacco.  Continue healthy diet and regular exercise. Continue current medications. Return visit in 6 months.

## 2022-04-26 NOTE — TELEPHONE ENCOUNTER
Initiated PA for Sildenafil Citrate 50 MG Oral Tab. Completed forms and faxed to [phone]. Await outcome.

## 2022-04-26 NOTE — TELEPHONE ENCOUNTER
Prior authorization for sildenafil has been approved from 4/26/22 through 4/26/23 pharmacy has been notified.

## 2022-04-29 ENCOUNTER — TELEPHONE (OUTPATIENT)
Dept: INTERNAL MEDICINE CLINIC | Facility: CLINIC | Age: 62
End: 2022-04-29

## 2022-04-29 NOTE — TELEPHONE ENCOUNTER
Spoke with patient ( verified) and relayed Dr. Sabine Delacruz message below--patient verbalizes understanding and agreement. No further questions/concerns at this time.

## 2022-06-08 ENCOUNTER — NURSE TRIAGE (OUTPATIENT)
Dept: INTERNAL MEDICINE CLINIC | Facility: CLINIC | Age: 62
End: 2022-06-08

## 2022-06-10 ENCOUNTER — NURSE TRIAGE (OUTPATIENT)
Dept: INTERNAL MEDICINE CLINIC | Facility: CLINIC | Age: 62
End: 2022-06-10

## 2022-06-23 ENCOUNTER — TELEPHONE (OUTPATIENT)
Dept: CASE MANAGEMENT | Age: 62
End: 2022-06-23

## 2022-09-30 ENCOUNTER — NURSE TRIAGE (OUTPATIENT)
Dept: INTERNAL MEDICINE CLINIC | Facility: CLINIC | Age: 62
End: 2022-09-30

## 2022-10-03 RX ORDER — ROSUVASTATIN CALCIUM 20 MG/1
20 TABLET, COATED ORAL DAILY
COMMUNITY
Start: 2022-09-30

## 2022-10-03 NOTE — TELEPHONE ENCOUNTER
Spouse Martha called to cancel today's appointment with Dr Arlette Leon. Indicated that Dr Dyan Park lowered patient's cholesterol medication and patient's symptoms have resolved the past 3 days. No longer having diarrhea. Appointment cancelled.

## 2022-11-07 RX ORDER — CLOTRIMAZOLE AND BETAMETHASONE DIPROPIONATE 10; .64 MG/G; MG/G
1 CREAM TOPICAL 2 TIMES DAILY
Qty: 45 G | Refills: 1 | Status: SHIPPED | OUTPATIENT
Start: 2022-11-07 | End: 2023-11-07

## 2022-11-07 NOTE — TELEPHONE ENCOUNTER
Pharmacy is requesting the following medication that did not appear in Current List of Medications. Clotrimazole - not other details. New Rx requested.

## 2022-11-29 ENCOUNTER — LAB ENCOUNTER (OUTPATIENT)
Dept: LAB | Facility: HOSPITAL | Age: 62
End: 2022-11-29
Attending: NURSE PRACTITIONER
Payer: COMMERCIAL

## 2022-11-29 DIAGNOSIS — I70.0 AORTIC ATHEROSCLEROSIS (HCC): Primary | ICD-10-CM

## 2022-11-29 DIAGNOSIS — E78.00 PURE HYPERCHOLESTEROLEMIA: ICD-10-CM

## 2022-11-29 LAB
ALT SERPL-CCNC: 25 U/L
AST SERPL-CCNC: 17 U/L (ref 15–37)
CHOLEST SERPL-MCNC: 129 MG/DL (ref ?–200)
FASTING PATIENT LIPID ANSWER: YES
HDLC SERPL-MCNC: 47 MG/DL (ref 40–59)
LDLC SERPL CALC-MCNC: 58 MG/DL (ref ?–100)
NONHDLC SERPL-MCNC: 82 MG/DL (ref ?–130)
TRIGL SERPL-MCNC: 140 MG/DL (ref 30–149)
VLDLC SERPL CALC-MCNC: 21 MG/DL (ref 0–30)

## 2022-11-29 PROCEDURE — 84450 TRANSFERASE (AST) (SGOT): CPT

## 2022-11-29 PROCEDURE — 80061 LIPID PANEL: CPT

## 2022-11-29 PROCEDURE — 36415 COLL VENOUS BLD VENIPUNCTURE: CPT

## 2022-11-29 PROCEDURE — 84460 ALANINE AMINO (ALT) (SGPT): CPT

## 2023-04-15 ENCOUNTER — LAB ENCOUNTER (OUTPATIENT)
Dept: LAB | Age: 63
End: 2023-04-15
Attending: INTERNAL MEDICINE
Payer: COMMERCIAL

## 2023-04-15 ENCOUNTER — OFFICE VISIT (OUTPATIENT)
Dept: INTERNAL MEDICINE CLINIC | Facility: CLINIC | Age: 63
End: 2023-04-15

## 2023-04-15 VITALS
HEIGHT: 73 IN | DIASTOLIC BLOOD PRESSURE: 72 MMHG | HEART RATE: 69 BPM | SYSTOLIC BLOOD PRESSURE: 132 MMHG | WEIGHT: 195 LBS | BODY MASS INDEX: 25.84 KG/M2

## 2023-04-15 DIAGNOSIS — I70.0 AORTIC ATHEROSCLEROSIS (HCC): ICD-10-CM

## 2023-04-15 DIAGNOSIS — I42.0 DILATED CARDIOMYOPATHY (HCC): ICD-10-CM

## 2023-04-15 DIAGNOSIS — I10 ESSENTIAL HYPERTENSION: ICD-10-CM

## 2023-04-15 DIAGNOSIS — Z00.00 ANNUAL PHYSICAL EXAM: ICD-10-CM

## 2023-04-15 DIAGNOSIS — E78.00 HYPERCHOLESTEROLEMIA: ICD-10-CM

## 2023-04-15 DIAGNOSIS — Z00.00 ANNUAL PHYSICAL EXAM: Primary | ICD-10-CM

## 2023-04-15 DIAGNOSIS — Z72.0 TOBACCO USE: ICD-10-CM

## 2023-04-15 LAB
ALBUMIN SERPL-MCNC: 3.7 G/DL (ref 3.4–5)
ALBUMIN/GLOB SERPL: 1.1 {RATIO} (ref 1–2)
ALP LIVER SERPL-CCNC: 55 U/L
ALT SERPL-CCNC: 23 U/L
ANION GAP SERPL CALC-SCNC: 5 MMOL/L (ref 0–18)
AST SERPL-CCNC: 17 U/L (ref 15–37)
BILIRUB SERPL-MCNC: 0.6 MG/DL (ref 0.1–2)
BUN BLD-MCNC: 16 MG/DL (ref 7–18)
BUN/CREAT SERPL: 15.2 (ref 10–20)
CALCIUM BLD-MCNC: 8.9 MG/DL (ref 8.5–10.1)
CHLORIDE SERPL-SCNC: 108 MMOL/L (ref 98–112)
CHOLEST SERPL-MCNC: 127 MG/DL (ref ?–200)
CO2 SERPL-SCNC: 26 MMOL/L (ref 21–32)
COMPLEXED PSA SERPL-MCNC: 1.33 NG/ML (ref ?–4)
CREAT BLD-MCNC: 1.05 MG/DL
DEPRECATED RDW RBC AUTO: 48.1 FL (ref 35.1–46.3)
ERYTHROCYTE [DISTWIDTH] IN BLOOD BY AUTOMATED COUNT: 13.5 % (ref 11–15)
FASTING PATIENT LIPID ANSWER: YES
FASTING STATUS PATIENT QL REPORTED: YES
GFR SERPLBLD BASED ON 1.73 SQ M-ARVRAT: 79 ML/MIN/1.73M2 (ref 60–?)
GLOBULIN PLAS-MCNC: 3.4 G/DL (ref 2.8–4.4)
GLUCOSE BLD-MCNC: 101 MG/DL (ref 70–99)
HCT VFR BLD AUTO: 45.2 %
HDLC SERPL-MCNC: 51 MG/DL (ref 40–59)
HGB BLD-MCNC: 14.9 G/DL
LDLC SERPL CALC-MCNC: 56 MG/DL (ref ?–100)
MCH RBC QN AUTO: 31.5 PG (ref 26–34)
MCHC RBC AUTO-ENTMCNC: 33 G/DL (ref 31–37)
MCV RBC AUTO: 95.6 FL
NONHDLC SERPL-MCNC: 76 MG/DL (ref ?–130)
OSMOLALITY SERPL CALC.SUM OF ELEC: 289 MOSM/KG (ref 275–295)
PLATELET # BLD AUTO: 233 10(3)UL (ref 150–450)
POTASSIUM SERPL-SCNC: 4.5 MMOL/L (ref 3.5–5.1)
PROT SERPL-MCNC: 7.1 G/DL (ref 6.4–8.2)
RBC # BLD AUTO: 4.73 X10(6)UL
SODIUM SERPL-SCNC: 139 MMOL/L (ref 136–145)
TRIGL SERPL-MCNC: 111 MG/DL (ref 30–149)
VLDLC SERPL CALC-MCNC: 16 MG/DL (ref 0–30)
WBC # BLD AUTO: 7.6 X10(3) UL (ref 4–11)

## 2023-04-15 PROCEDURE — 80061 LIPID PANEL: CPT

## 2023-04-15 PROCEDURE — 3075F SYST BP GE 130 - 139MM HG: CPT | Performed by: INTERNAL MEDICINE

## 2023-04-15 PROCEDURE — 3008F BODY MASS INDEX DOCD: CPT | Performed by: INTERNAL MEDICINE

## 2023-04-15 PROCEDURE — 3078F DIAST BP <80 MM HG: CPT | Performed by: INTERNAL MEDICINE

## 2023-04-15 PROCEDURE — 36415 COLL VENOUS BLD VENIPUNCTURE: CPT

## 2023-04-15 PROCEDURE — 99396 PREV VISIT EST AGE 40-64: CPT | Performed by: INTERNAL MEDICINE

## 2023-04-15 PROCEDURE — 85027 COMPLETE CBC AUTOMATED: CPT

## 2023-04-15 PROCEDURE — 80053 COMPREHEN METABOLIC PANEL: CPT

## 2023-04-15 NOTE — PATIENT INSTRUCTIONS
Your blood pressure today was good at 132/72 and your exam was normal.  Await results of today's blood tests. Please complete the stool card for colon cancer screening. Continue current medication, healthy diet and regular exercise. Please try to stop smoking. Return visit in 6 months for a blood pressure check.

## 2023-04-17 ENCOUNTER — TELEPHONE (OUTPATIENT)
Dept: INTERNAL MEDICINE CLINIC | Facility: CLINIC | Age: 63
End: 2023-04-17

## 2023-07-28 ENCOUNTER — NURSE TRIAGE (OUTPATIENT)
Dept: INTERNAL MEDICINE CLINIC | Facility: CLINIC | Age: 63
End: 2023-07-28

## 2023-07-28 NOTE — TELEPHONE ENCOUNTER
Action Requested: Summary for Provider     []  Critical Lab, Recommendations Needed  [] Need Additional Advice  []   FYI    []   Need Orders  [] Need Medications Sent to Pharmacy  [x]  Other     SUMMARY:   Verified name and  of patient. Wife of patient calling to state that patient has had intermittent diarrhea over the past couple of days- since patient is not with her at this time, this RN called patient to triage. Verified name and . Patient states that over the past couple of days he has had intermittent diarrhea and middle abdominal pain. He states that he did have formed stool this morning but has also had 4-5 loose stools throughout the day. He denies any dizziness, lightheadedness, or any other symptoms of dehydration at this time. Per protocol, advised patient to be seen in office over the next 3 days. Appointment scheduled:  Future Appointments   Date Time Provider Mahnaz Odalis   2023  1:00 PM River Cueva MD [facility]       Patient advised per protocol to push fluids- water and sports drinks, eat probiotics, follow BRAT diet, and to call back if symptoms worsen or head to urgent care. Patient verbalizes understanding and agrees with plan.     Reason for call: Acute  Onset: Data Unavailable      Reason for Disposition   Patient wants to be seen    Protocols used: Leoncio Hutchins

## 2023-07-31 NOTE — TELEPHONE ENCOUNTER
Spoke to Ashley Even, spouse, she verified patient's full name and . She is not on regular JOHN, only as emergency contact. She said that patient spoke to a nurse over the weekend and it was advised he start a probiotic. He started [employer]. He has been on that for 3 days and feels 100% better. Ashley Even asked to cancel appointment and will call  back if patient has abdominal pain again.

## 2023-08-28 ENCOUNTER — LAB ENCOUNTER (OUTPATIENT)
Dept: LAB | Age: 63
End: 2023-08-28
Attending: INTERNAL MEDICINE
Payer: COMMERCIAL

## 2023-08-28 ENCOUNTER — TELEPHONE (OUTPATIENT)
Dept: INTERNAL MEDICINE CLINIC | Facility: CLINIC | Age: 63
End: 2023-08-28

## 2023-08-28 ENCOUNTER — OFFICE VISIT (OUTPATIENT)
Dept: INTERNAL MEDICINE CLINIC | Facility: CLINIC | Age: 63
End: 2023-08-28

## 2023-08-28 VITALS
WEIGHT: 202.19 LBS | SYSTOLIC BLOOD PRESSURE: 115 MMHG | BODY MASS INDEX: 26.8 KG/M2 | HEART RATE: 71 BPM | DIASTOLIC BLOOD PRESSURE: 77 MMHG | HEIGHT: 73 IN

## 2023-08-28 DIAGNOSIS — I10 ESSENTIAL HYPERTENSION: ICD-10-CM

## 2023-08-28 DIAGNOSIS — R19.7 DIARRHEA, UNSPECIFIED TYPE: ICD-10-CM

## 2023-08-28 DIAGNOSIS — R19.7 DIARRHEA, UNSPECIFIED TYPE: Primary | ICD-10-CM

## 2023-08-28 LAB
ALBUMIN SERPL-MCNC: 3.7 G/DL (ref 3.4–5)
ALBUMIN/GLOB SERPL: 1 {RATIO} (ref 1–2)
ALP LIVER SERPL-CCNC: 61 U/L
ALT SERPL-CCNC: 34 U/L
ANION GAP SERPL CALC-SCNC: 4 MMOL/L (ref 0–18)
AST SERPL-CCNC: 25 U/L (ref 15–37)
BILIRUB SERPL-MCNC: 0.5 MG/DL (ref 0.1–2)
BUN BLD-MCNC: 16 MG/DL (ref 7–18)
BUN/CREAT SERPL: 14.5 (ref 10–20)
CALCIUM BLD-MCNC: 9.2 MG/DL (ref 8.5–10.1)
CHLORIDE SERPL-SCNC: 110 MMOL/L (ref 98–112)
CO2 SERPL-SCNC: 26 MMOL/L (ref 21–32)
CREAT BLD-MCNC: 1.1 MG/DL
DEPRECATED RDW RBC AUTO: 52.1 FL (ref 35.1–46.3)
EGFRCR SERPLBLD CKD-EPI 2021: 74 ML/MIN/1.73M2 (ref 60–?)
ERYTHROCYTE [DISTWIDTH] IN BLOOD BY AUTOMATED COUNT: 14.3 % (ref 11–15)
FASTING STATUS PATIENT QL REPORTED: NO
GLOBULIN PLAS-MCNC: 3.7 G/DL (ref 2.8–4.4)
GLUCOSE BLD-MCNC: 113 MG/DL (ref 70–99)
HCT VFR BLD AUTO: 45.1 %
HGB BLD-MCNC: 14.8 G/DL
MCH RBC QN AUTO: 32.2 PG (ref 26–34)
MCHC RBC AUTO-ENTMCNC: 32.8 G/DL (ref 31–37)
MCV RBC AUTO: 98.3 FL
OSMOLALITY SERPL CALC.SUM OF ELEC: 292 MOSM/KG (ref 275–295)
PLATELET # BLD AUTO: 201 10(3)UL (ref 150–450)
POTASSIUM SERPL-SCNC: 4.9 MMOL/L (ref 3.5–5.1)
PROT SERPL-MCNC: 7.4 G/DL (ref 6.4–8.2)
RBC # BLD AUTO: 4.59 X10(6)UL
SODIUM SERPL-SCNC: 140 MMOL/L (ref 136–145)
WBC # BLD AUTO: 10.1 X10(3) UL (ref 4–11)

## 2023-08-28 PROCEDURE — 80053 COMPREHEN METABOLIC PANEL: CPT

## 2023-08-28 PROCEDURE — 36415 COLL VENOUS BLD VENIPUNCTURE: CPT

## 2023-08-28 PROCEDURE — 99214 OFFICE O/P EST MOD 30 MIN: CPT | Performed by: INTERNAL MEDICINE

## 2023-08-28 PROCEDURE — 3078F DIAST BP <80 MM HG: CPT | Performed by: INTERNAL MEDICINE

## 2023-08-28 PROCEDURE — 85027 COMPLETE CBC AUTOMATED: CPT

## 2023-08-28 PROCEDURE — 3008F BODY MASS INDEX DOCD: CPT | Performed by: INTERNAL MEDICINE

## 2023-08-28 PROCEDURE — 3074F SYST BP LT 130 MM HG: CPT | Performed by: INTERNAL MEDICINE

## 2023-08-28 RX ORDER — OMEPRAZOLE 20 MG/1
20 CAPSULE, DELAYED RELEASE ORAL NIGHTLY
COMMUNITY

## 2023-08-28 NOTE — PATIENT INSTRUCTIONS
Await results of blood tests. Please schedule an appointment with GI. Avoid triggers for your diarrhea as much as possible including stress and dairy products. Use Imodium as needed for diarrhea. Please schedule a physical next April.

## 2024-05-04 ENCOUNTER — OFFICE VISIT (OUTPATIENT)
Dept: INTERNAL MEDICINE CLINIC | Facility: CLINIC | Age: 64
End: 2024-05-04

## 2024-05-04 VITALS
BODY MASS INDEX: 26.56 KG/M2 | HEIGHT: 73 IN | SYSTOLIC BLOOD PRESSURE: 132 MMHG | DIASTOLIC BLOOD PRESSURE: 68 MMHG | HEART RATE: 69 BPM | OXYGEN SATURATION: 97 % | WEIGHT: 200.38 LBS

## 2024-05-04 DIAGNOSIS — Z00.00 ANNUAL PHYSICAL EXAM: Primary | ICD-10-CM

## 2024-05-04 DIAGNOSIS — I70.0 AORTIC ATHEROSCLEROSIS (HCC): ICD-10-CM

## 2024-05-04 DIAGNOSIS — I10 ESSENTIAL HYPERTENSION: ICD-10-CM

## 2024-05-04 DIAGNOSIS — E78.00 HYPERCHOLESTEROLEMIA: ICD-10-CM

## 2024-05-04 DIAGNOSIS — I42.0 DILATED CARDIOMYOPATHY (HCC): ICD-10-CM

## 2024-05-04 PROCEDURE — 3078F DIAST BP <80 MM HG: CPT | Performed by: INTERNAL MEDICINE

## 2024-05-04 PROCEDURE — 3075F SYST BP GE 130 - 139MM HG: CPT | Performed by: INTERNAL MEDICINE

## 2024-05-04 PROCEDURE — 99397 PER PM REEVAL EST PAT 65+ YR: CPT | Performed by: INTERNAL MEDICINE

## 2024-05-04 PROCEDURE — 3008F BODY MASS INDEX DOCD: CPT | Performed by: INTERNAL MEDICINE

## 2024-05-04 RX ORDER — CARVEDILOL 25 MG/1
25 TABLET ORAL 2 TIMES DAILY WITH MEALS
COMMUNITY

## 2024-05-04 NOTE — H&P
Fco Lopez is a 63 year old male who presents for a complete physical exam.   HPI:   His last physical was April 2023.  He never scheduled an appointment with GI as recommended at his last visit here last August.  GI symptoms persist, as outlined below.  Otherwise he has been feeling well.  No particular issues for discussion today.    Past medical and past surgical histories reviewed, as outlined below.  He saw Dr. Lancaster of Cardiology in January and no changes were made to his medications.  Medications reviewed, as listed.  No medication allergies.    BP checks at home typically 120s/70s.  Diet healthy and he has been exercising 2 days weekly, walking and running.  Weight up 5 pounds since physical April 2023.  He unfortunately continues to smoke 1/2 pack of cigarettes daily.  He continues to decline all routinely recommended immunizations.    Wt Readings from Last 4 Encounters:   05/04/24 200 lb 6 oz (90.9 kg)   08/28/23 202 lb 3.2 oz (91.7 kg)   04/15/23 195 lb (88.5 kg)   04/22/22 204 lb (92.5 kg)     Body mass index is 26.44 kg/m².     Current Outpatient Medications   Medication Sig Dispense Refill    carvedilol 25 MG Oral Tab Take 1 tablet (25 mg total) by mouth 2 (two) times daily with meals.      omeprazole 20 MG Oral Capsule Delayed Release Take 1 capsule (20 mg total) by mouth at bedtime.      rosuvastatin 20 MG Oral Tab Take 1 tablet (20 mg total) by mouth daily.      sacubitril-valsartan (ENTRESTO) 24-26 MG Oral Tab Take 1 tablet by mouth 2 (two) times daily.      Sildenafil Citrate 50 MG Oral Tab Take 1 tablet (50 mg total) by mouth daily as needed for Erectile Dysfunction. 8 tablet 2    aspirin 81 MG Oral Chew Tab Chew 1 tablet (81 mg total) by mouth daily. 30 tablet 0     No Known Allergies   Past Medical History:    Aortic atherosclerosis (HCC)    CXR 1-18    Dilated cardiomyopathy (HCC)    Echo 9-21 with EF 25-30%, mild MR; cardiac cath 9-21 without obstructive CAD; Echo 9-22 with EF 44%, mild  MR    Essential hypertension    Hypercholesterolemia    Left bundle branch block      Past Surgical History:   Procedure Laterality Date    Other      Laceration left wrist with ulnar nerve injury      Family History   Problem Relation Age of Onset    Other (Stomach Cancer [Other]) Father          age 49    Hypertension Maternal Grandmother     Hypertension Maternal Aunt     Hypertension Maternal Uncle       Social History:  Social History     Socioeconomic History    Marital status:    Occupational History    Occupation:    Tobacco Use    Smoking status: Every Day     Current packs/day: 1.00     Average packs/day: 1 pack/day for 35.0 years (35.0 ttl pk-yrs)     Types: Cigarettes    Smokeless tobacco: Never    Tobacco comments:     Occasional   Vaping Use    Vaping status: Never Used   Substance and Sexual Activity    Alcohol use: Yes     Alcohol/week: 3.0 - 4.0 standard drinks of alcohol     Types: 3 - 4 Cans of beer per week     Comment: Occasional    Drug use: No   Other Topics Concern    Caffeine Concern Yes     Comment: coffee, 2 cups    Right Handed Yes     Social Determinants of Health     Financial Resource Strain: Low Risk  (2021)    Received from [facility]    Overall Financial Resource Strain (CARDIA)     Difficulty of Paying Living Expenses: Not hard at all   Transportation Needs: No Transportation Needs (2021)    [facility]    PRAPARE - Transportation     Lack of Transportation (Medical): No     Lack of Transportation (Non-Medical): No           REVIEW OF SYSTEMS:   GENERAL: No fever  LUNGS: No cough wheezing or shortness of breath  CARDIAC: No lightheadedness palpitations or chest pain.  No orthopnea or PND  GI: Occasional mid abdominal pain and occasional diarrhea, both triggered by dairy products.  No anorexia heartburn dysphagia nausea vomiting constipation or rectal bleeding  : No  urinary frequency dysuria or hematuria  MUSCULOSKELETAL: No leg swelling  NEURO: No headaches    EXAM:   GENERAL: Pleasant male appearing well in no distress  /68   Pulse 69   Ht 6' 1\" (1.854 m)   Wt 200 lb 6 oz (90.9 kg)   SpO2 97%   BMI 26.44 kg/m²   HEENT: Anicteric, conjunctiva pink, oropharynx normal  NECK: Supple without mass or thyromegaly  NODES: No peripheral adenopathy  LUNGS: Resonant to percussion and clear to auscultation  CARDIAC: Rhythm regular S1 S2 normal without murmur or edema  ABDOMEN: Bowel sounds normal soft nontender without mass or hepatosplenomegaly  EXTREMITIES: Normal without cyanosis or clubbing  PULSES: Carotid upstrokes 2+ without carotid bruits, distal pulses 2+ bilateral dorsalis pedis and posterior tibial  NEURO: Reflexes 2+ bilaterally and symmetric     ASSESSMENT AND PLAN:   Fco Lopez is a 63 year old male who presents for a complete physical exam.     1. Annual physical exam  Discussed and recommended routine immunizations but he again declines  Check CMP CBC glycohemoglobin lipid profile and screening PSA.  Order sent  Continue current medications.  Reinforced healthy diet and regular exercise  Recommend attempts at tobacco cessation  Again recommend he schedule an appointment with GI for screening colonoscopy.  Referral has been done.  He says he will schedule  Annual physical  Return visit in 6 months for blood pressure check.  - Comp Metabolic Panel (14); Future  - CBC, Platelet; No Differential; Future  - Hemoglobin A1C; Future  - Lipid Panel; Future  - PSA Total, Screen; Future    2. Dilated cardiomyopathy (HCC)  Improved on most recent Echo and asymptomatic  Continue current medications and Cardiology follow-up    3. Essential hypertension  Well-controlled.  Continue current medication    4. Hypercholesterolemia  Continue statin and await labs    5. Aortic atherosclerosis (HCC)  Asymptomatic.  Risk factor control including statin and aspirin      Dudley  MD Suzette  5/4/2024  11:21 AM

## 2024-05-04 NOTE — PATIENT INSTRUCTIONS
Your blood pressure today was good at 132/68  Await results of blood tests  Please schedule an appointment with GI to arrange colonoscopy  Continue current medication, healthy diet and regular exercise  Please try to stop smoking  Return visit in 6 months

## 2024-05-09 ENCOUNTER — TELEPHONE (OUTPATIENT)
Facility: CLINIC | Age: 64
End: 2024-05-09

## 2024-05-09 ENCOUNTER — LAB ENCOUNTER (OUTPATIENT)
Dept: LAB | Facility: HOSPITAL | Age: 64
End: 2024-05-09
Attending: INTERNAL MEDICINE
Payer: COMMERCIAL

## 2024-05-09 ENCOUNTER — OFFICE VISIT (OUTPATIENT)
Facility: CLINIC | Age: 64
End: 2024-05-09
Payer: COMMERCIAL

## 2024-05-09 VITALS
DIASTOLIC BLOOD PRESSURE: 89 MMHG | HEIGHT: 73 IN | HEART RATE: 73 BPM | WEIGHT: 200 LBS | BODY MASS INDEX: 26.51 KG/M2 | SYSTOLIC BLOOD PRESSURE: 146 MMHG

## 2024-05-09 DIAGNOSIS — K21.9 GASTROESOPHAGEAL REFLUX DISEASE, UNSPECIFIED WHETHER ESOPHAGITIS PRESENT: ICD-10-CM

## 2024-05-09 DIAGNOSIS — R19.7 DIARRHEA, UNSPECIFIED TYPE: ICD-10-CM

## 2024-05-09 DIAGNOSIS — R19.4 FREQUENT BOWEL MOVEMENTS: ICD-10-CM

## 2024-05-09 DIAGNOSIS — R14.0 BLOATING: ICD-10-CM

## 2024-05-09 DIAGNOSIS — Z12.11 COLON CANCER SCREENING: ICD-10-CM

## 2024-05-09 DIAGNOSIS — Z80.0 FAMILY HISTORY OF STOMACH CANCER: ICD-10-CM

## 2024-05-09 DIAGNOSIS — R10.10 UPPER ABDOMINAL PAIN: Primary | ICD-10-CM

## 2024-05-09 DIAGNOSIS — Z00.00 ANNUAL PHYSICAL EXAM: ICD-10-CM

## 2024-05-09 LAB
ALBUMIN SERPL-MCNC: 4.6 G/DL (ref 3.2–4.8)
ALBUMIN/GLOB SERPL: 1.8 {RATIO} (ref 1–2)
ALP LIVER SERPL-CCNC: 57 U/L
ALT SERPL-CCNC: 18 U/L
ANION GAP SERPL CALC-SCNC: 4 MMOL/L (ref 0–18)
AST SERPL-CCNC: 20 U/L (ref ?–34)
BILIRUB SERPL-MCNC: 0.6 MG/DL (ref 0.2–1.1)
BUN BLD-MCNC: 10 MG/DL (ref 9–23)
BUN/CREAT SERPL: 10 (ref 10–20)
CALCIUM BLD-MCNC: 9.5 MG/DL (ref 8.7–10.4)
CHLORIDE SERPL-SCNC: 110 MMOL/L (ref 98–112)
CHOLEST SERPL-MCNC: 140 MG/DL (ref ?–200)
CO2 SERPL-SCNC: 28 MMOL/L (ref 21–32)
COMPLEXED PSA SERPL-MCNC: 0.92 NG/ML (ref ?–4)
CREAT BLD-MCNC: 1 MG/DL
DEPRECATED RDW RBC AUTO: 50.5 FL (ref 35.1–46.3)
EGFRCR SERPLBLD CKD-EPI 2021: 84 ML/MIN/1.73M2 (ref 60–?)
ERYTHROCYTE [DISTWIDTH] IN BLOOD BY AUTOMATED COUNT: 14.4 % (ref 11–15)
EST. AVERAGE GLUCOSE BLD GHB EST-MCNC: 123 MG/DL (ref 68–126)
FASTING PATIENT LIPID ANSWER: YES
FASTING STATUS PATIENT QL REPORTED: YES
GLOBULIN PLAS-MCNC: 2.5 G/DL (ref 2–3.5)
GLUCOSE BLD-MCNC: 108 MG/DL (ref 70–99)
HBA1C MFR BLD: 5.9 % (ref ?–5.7)
HCT VFR BLD AUTO: 46.6 %
HDLC SERPL-MCNC: 52 MG/DL (ref 40–59)
HGB BLD-MCNC: 15.5 G/DL
LDLC SERPL CALC-MCNC: 68 MG/DL (ref ?–100)
MCH RBC QN AUTO: 31.5 PG (ref 26–34)
MCHC RBC AUTO-ENTMCNC: 33.3 G/DL (ref 31–37)
MCV RBC AUTO: 94.7 FL
NONHDLC SERPL-MCNC: 88 MG/DL (ref ?–130)
OSMOLALITY SERPL CALC.SUM OF ELEC: 294 MOSM/KG (ref 275–295)
PLATELET # BLD AUTO: 229 10(3)UL (ref 150–450)
POTASSIUM SERPL-SCNC: 5 MMOL/L (ref 3.5–5.1)
PROT SERPL-MCNC: 7.1 G/DL (ref 5.7–8.2)
RBC # BLD AUTO: 4.92 X10(6)UL
SODIUM SERPL-SCNC: 142 MMOL/L (ref 136–145)
TRIGL SERPL-MCNC: 108 MG/DL (ref 30–149)
VLDLC SERPL CALC-MCNC: 16 MG/DL (ref 0–30)
WBC # BLD AUTO: 6.9 X10(3) UL (ref 4–11)

## 2024-05-09 PROCEDURE — 99204 OFFICE O/P NEW MOD 45 MIN: CPT

## 2024-05-09 PROCEDURE — 3008F BODY MASS INDEX DOCD: CPT

## 2024-05-09 PROCEDURE — 36415 COLL VENOUS BLD VENIPUNCTURE: CPT

## 2024-05-09 PROCEDURE — 80061 LIPID PANEL: CPT

## 2024-05-09 PROCEDURE — 83013 H PYLORI (C-13) BREATH: CPT

## 2024-05-09 PROCEDURE — 80053 COMPREHEN METABOLIC PANEL: CPT

## 2024-05-09 PROCEDURE — 3077F SYST BP >= 140 MM HG: CPT

## 2024-05-09 PROCEDURE — 3079F DIAST BP 80-89 MM HG: CPT

## 2024-05-09 PROCEDURE — 85027 COMPLETE CBC AUTOMATED: CPT

## 2024-05-09 PROCEDURE — 83036 HEMOGLOBIN GLYCOSYLATED A1C: CPT

## 2024-05-09 RX ORDER — FAMOTIDINE 20 MG/1
20 TABLET, FILM COATED ORAL 2 TIMES DAILY
Qty: 60 TABLET | Refills: 0 | Status: SHIPPED | OUTPATIENT
Start: 2024-05-09 | End: 2024-06-08

## 2024-05-09 RX ORDER — DICYCLOMINE HYDROCHLORIDE 10 MG/1
10 CAPSULE ORAL
Qty: 120 CAPSULE | Refills: 2 | Status: SHIPPED | OUTPATIENT
Start: 2024-05-09 | End: 2024-08-07

## 2024-05-09 NOTE — H&P
[facility] - Gastroenterology                                                                                                               Reason for consult: abd pain & diarrhea    Requesting physician or provider: Dudley Bradford MD    Chief Complaint   Patient presents with    Abdominal Pain    Diarrhea       HPI:   Fco Lopez is a 63 year old year-old male with active diagnoses including aortc atherosclerosis, hypertension, hypercholesterolemia, left bundle branch block, cardiomyopathy. Prior medical/surgical history in table below.    he is here today for evaluation  #upper abdominal pain  #GERD  #gas  -frequent upper abdominal pain, GERD, gas, bloating & diarrhea x years, worse the past month  -takes prilosec daily for a couple years. If he skips a dose and goes to bed he regurgitates acid into mouth & nose  -4-5 bowel movements daily of diarrhea, type 6 on bristol stool chart. He reports black stools when he takes pepto bismol  -diet recall: breakfast: oatmeal, yogurt, banana // lunch: soup and crackers // dinner: chicken, sweet potatoes, apple sauce // snack: cookies, nuts // drinks: gatorade zero, water  -avoids dairy because causes worsening abdominal pain and diarrhea    Patient denies symptoms of nausea, vomiting, dysphagia, odynophagia, globus sensation, , hematemesis, change in bowel habits, constipation, hematochezia, or melena. he denies recent change in appetite, fever or unintentional weight loss.      Last colonoscopy: none   Last EGD: none     NSAIDS: ibuprofen occasional  Tobacco:  1/2 PPD  Alcohol: 3-4 beers a week  Marijuana: none   Illicit drugs: none     FH GI malignancy: Dad - stomach cancer  FH IBD: none     No history of adverse reaction to sedation  No WADE  No anticoagulants/antiplatelet  No pacemaker/defibrillator    Wt Readings from Last 6 Encounters:   05/09/24 200 lb (90.7 kg)   05/04/24  200 lb 6 oz (90.9 kg)   23 202 lb 3.2 oz (91.7 kg)   04/15/23 195 lb (88.5 kg)   22 204 lb (92.5 kg)   10/05/21 196 lb 6.4 oz (89.1 kg)        History, Medications, Allergies, ROS:      Past Medical History:    Aortic atherosclerosis (HCC)    CXR     Dilated cardiomyopathy (HCC)    Echo  with EF 25-30%, mild MR; cardiac cath  without obstructive CAD; Echo  with EF 44%, mild MR    Essential hypertension    Hypercholesterolemia    Left bundle branch block      Past Surgical History:   Procedure Laterality Date    Other      Laceration left wrist with ulnar nerve injury      Family Hx:   Family History   Problem Relation Age of Onset    Other (Stomach Cancer [Other]) Father          age 49    Hypertension Maternal Grandmother     Hypertension Maternal Aunt     Hypertension Maternal Uncle       Social History:   Social History     Socioeconomic History    Marital status:    Occupational History    Occupation:    Tobacco Use    Smoking status: Every Day     Current packs/day: 1.00     Average packs/day: 1 pack/day for 35.0 years (35.0 ttl pk-yrs)     Types: Cigarettes    Smokeless tobacco: Never    Tobacco comments:     Occasional   Vaping Use    Vaping status: Never Used   Substance and Sexual Activity    Alcohol use: Yes     Alcohol/week: 3.0 - 4.0 standard drinks of alcohol     Types: 3 - 4 Cans of beer per week     Comment: Occasional    Drug use: No   Other Topics Concern    Caffeine Concern Yes     Comment: coffee, 2 cups    Right Handed Yes     Social Determinants of Health     Financial Resource Strain: Low Risk  (2021)    [facility], [facility]    Overall Financial Resource Strain (CARDIA)     Difficulty of Paying Living Expenses: Not hard at all   Transportation Needs: No Transportation Needs (2021)    Received from [facility]    PRAPARE - Transportation     Lack of Transportation  (Medical): No     Lack of Transportation (Non-Medical): No        Medications (Active prior to today's visit):  Current Outpatient Medications   Medication Sig Dispense Refill    dicyclomine 10 MG Oral Cap Take 1 capsule (10 mg total) by mouth 4 (four) times daily before meals and nightly. Take up to 4x daily for abdominal cramping/spasms 120 capsule 2    famotidine 20 MG Oral Tab Take 1 tablet (20 mg total) by mouth 2 (two) times daily. 60 tablet 0    carvedilol 25 MG Oral Tab Take 1 tablet (25 mg total) by mouth 2 (two) times daily with meals.      omeprazole 20 MG Oral Capsule Delayed Release Take 1 capsule (20 mg total) by mouth at bedtime.      rosuvastatin 20 MG Oral Tab Take 1 tablet (20 mg total) by mouth daily.      sacubitril-valsartan (ENTRESTO) 24-26 MG Oral Tab Take 1 tablet by mouth 2 (two) times daily.      Sildenafil Citrate 50 MG Oral Tab Take 1 tablet (50 mg total) by mouth daily as needed for Erectile Dysfunction. 8 tablet 2    aspirin 81 MG Oral Chew Tab Chew 1 tablet (81 mg total) by mouth daily. 30 tablet 0       Allergies:  No Known Allergies    ROS:   CONSTITUTIONAL: negative for fevers, chills, sweats  EYES Negative for scleral icterus or redness, and diplopia  HEENT: Negative for hoarseness  RESPIRATORY: Negative for cough and severe shortness of breath  CARDIOVASCULAR: Negative for crushing sub-sternal chest pain  GASTROINTESTINAL: See HPI  GENITOURINARY: Negative for dysuria  MUSCULOSKELETAL: Negative for arthralgias and myalgias  SKIN: Negative for jaundice, rash or pruritus  NEUROLOGICAL: Negative for dizziness and headaches  BEHAVIOR/PSYCH: Negative for psychotic behavior    PHYSICAL EXAM:   Blood pressure 146/89, pulse 73, height 6' 1\" (1.854 m), weight 200 lb (90.7 kg).    GEN: Alert, no acute distress, well-nourished   HEENT: anicteric sclera, neck supple, trachea midline, MMM, no palpable or tender neck or supraclavicular lymph nodes  CV: RRR, the extremities are warm and well  perfused   LUNGS: No increased work of breathing, CTAB  ABDOMEN: Soft, symmetrical, non-tender without distention or guarding. No scars. Aorta is without bruit or visible pulsation. Umbilicus is midline without herniation. Normoactive bowel sounds are present, No masses, hepatomegaly or splenomegaly noted. +nevi  MSK: No erythema, no warmth, no swelling of joints  SKIN: No jaundice, no erythema, no rashes, no lesions  HEMATOLOGIC: No bleeding, no bruising  NEURO: Alert and interactive, JOINER  PSYCH: appropriate mood & affect    Labs/Imaging/Procedures:     Patient's pertinent labs and imaging were reviewed and discussed with patient today.        .  ASSESSMENT/PLAN:   Fco Lopez is a 63 year old year-old male with active diagnoses including aortc atherosclerosis, hypertension, hypercholesterolemia, left bundle branch block, cardiomyopathy. Prior medical/surgical history in table below.    he is here today for evaluation  #upper abdominal pain  #GERD  #gas  -frequent upper abdominal pain, GERD, gas, bloating & diarrhea x years, worse the past month  -takes prilosec daily for a couple years. If he skips a dose and goes to bed he regurgitates acid into mouth & nose  -4-5 bowel movements daily of diarrhea, type 6 on bristol stool chart. He reports black stools when he takes pepto bismol  -diet recall: breakfast: oatmeal, yogurt, banana // lunch: soup and crackers // dinner: chicken, sweet potatoes, apple sauce // snack: cookies, nuts // drinks: gatorade zero, water  -avoids dairy because causes worsening abdominal pain and diarrhea    # Average Risk screening: patient is considered average risk for colon cancer (No family hx of colon cancer) and it is appropriate to proceed with screening colonoscopy. We discussed risks/benefits/alternatives to procedure, including CT colonography and stool testing, they want to proceed with colonoscopy.  No anemia noted on blood work August 2023.      -etiology likely multifactorial  including GERD, food intolerance and IBS-diarrhea. Has never had EGD or CLN. Does have family hx of stomach CA. Needs endoscopy for rule out gastritis, PUD, H. Pylori, barretts, microscopic colitis, IBD, malignancy.     Recommendations:  -follow up with me 1 month after endoscopy     -call  for ultrasound abdomen  #[phone]    -STOP taking prilosec/ omeprazole and pepto bismol for 2 weeks. INSTEAD you can take famotidine (pepcid) 20mg twice daily. Take on empty stomach. THEN in 2 weeks go to lab for H. Pylori test     -after H. Pylori test is back I will contact you with results and next treatment    -for diarrhea:   ---start taking fiber supplement, such as metamucil   --can take dicyclomine (bentyl), which is an antispasmodic. This can help with abdominal cramping and diarrhea  --can try lactaid if you are ingesting dairy    Schedule EGD & colonoscopy with Dr. Reagan, Dr. Garnett or Dr. Esteves   Diagnosis: upper abdominal pain, GERD, gas, bloating, CRC screen  Sedation: MAC  Prep: split dose golytely    ENDOSCOPIC RISK BENEFIT DISCUSSION: I described the procedure in great detail with the patient. I discussed the risks and benefits, including but not limited to: bleeding, perforation, infection, anesthesia complications, and even death. Patient will be NPO after midnight and will have a person physically present at time of pick-up to drive patient home. Patient verbalized understanding and agrees to proceed with procedure as planned.      Orders This Visit:  Orders Placed This Encounter   Procedures    Helicobacter Pylori Breath Test, Adult       Meds This Visit:  Requested Prescriptions     Signed Prescriptions Disp Refills    dicyclomine 10 MG Oral Cap 120 capsule 2     Sig: Take 1 capsule (10 mg total) by mouth 4 (four) times daily before meals and nightly. Take up to 4x daily for abdominal cramping/spasms    famotidine 20 MG Oral Tab 60 tablet 0     Sig: Take 1 tablet (20 mg total) by mouth 2  (two) times daily.       Imaging & Referrals:  US ABDOMEN COMPLETE ([id number]=[id number])      LACI Hayes    [facility]  5/9/2024        This note was partially prepared using Dragon Medical voice recognition dictation software. As a result, errors may occur. When identified, these errors have been corrected. While every attempt is made to correct errors during dictation, discrepancies may still exist.

## 2024-05-09 NOTE — TELEPHONE ENCOUNTER
I spoke with CHANTELLE Thomas and message from Claire Blevins given.  Voiced understanding.  No further questions at this time.

## 2024-05-09 NOTE — PATIENT INSTRUCTIONS
1. Schedule EGD & colonoscopy with Dr. Reagan, Dr. Garnett or Dr. Esteves   Diagnosis: upper abdominal pain, GERD, gas, bloating, CRC screen  Sedation: MAC  Prep: split dose golytely    2.  bowel prep from pharmacy   You can pick the bowel prep up now and store in a cool, dry place in your home until your scheduled bowel prep start date.    3. Continue all medications as normal for your procedure. DO NOT TAKE: Any form of alcohol, recreational drugs and any forms of erectile dysfunction medications 24 hours prior to procedure.    4. Read all bowel prep instructions carefully. Bowel prep instructions can also be found online at:  www.[facility].org/giprep     5. AVOID seeds, nuts, popcorn, raw fruits and vegetables for 5 days before procedure    6. If you start any NEW medication after your visit today, please notify us. Certain medications (like iron or weight loss medications) will need to be held before the procedure, or the procedure cannot be performed safely.       -follow up with me 1 month after endoscopy     -call  for ultrasound abdomen  #[phone]    -STOP taking prilosec/ omeprazole and pepto bismol for 2 weeks. INSTEAD you can take famotidine (pepcid) 20mg twice daily. Take on empty stomach. THEN in 2 weeks go to lab for H. Pylori test     -after H. Pylori test is back I will contact you with results and next treatment    -for diarrhea:   ---start taking fiber supplement, such as metamucil   --can take dicyclomine (bentyl), which is an antispasmodic. This can help with abdominal cramping and diarrhea  --can try lactaid if you are ingesting dairy

## 2024-05-09 NOTE — TELEPHONE ENCOUNTER
Wife states Fco did his H. Pylori test today by accident along with his blood work.Last Prilosec taken was yesterday.  Wife asking if he should repeat this test again.  Also, 1st available ultrasound appointment isn't till June 6th. Is that okay with you to wait till then? Patient is on a wait list.  Please advise. Thank you.

## 2024-05-09 NOTE — TELEPHONE ENCOUNTER
Wife states patient completed the h pylori test today and wants to know if this was done too soon please follow up

## 2024-05-10 ENCOUNTER — TELEPHONE (OUTPATIENT)
Facility: CLINIC | Age: 64
End: 2024-05-10

## 2024-05-10 DIAGNOSIS — R14.0 BLOATING: ICD-10-CM

## 2024-05-10 DIAGNOSIS — R10.10 UPPER ABDOMINAL PAIN: Primary | ICD-10-CM

## 2024-05-10 DIAGNOSIS — K21.9 GASTROESOPHAGEAL REFLUX DISEASE, UNSPECIFIED WHETHER ESOPHAGITIS PRESENT: ICD-10-CM

## 2024-05-10 DIAGNOSIS — R14.0 GASES: ICD-10-CM

## 2024-05-10 DIAGNOSIS — Z12.11 SCREENING FOR COLON CANCER: ICD-10-CM

## 2024-05-10 NOTE — TELEPHONE ENCOUNTER
Scheduled for:  Colonoscopy 86299, EGD 13907  Provider Name:    Date:  9/19/2024  Location: [facility]   Sedation:  MAC  Time: 9:30 am, (pt is aware to arrive at 8:30 am)   Prep:  Golytely  Meds/Allergies Reconciled?: Claire/LACI reviewed.   Diagnosis with codes:  Upper Abdominal Pain R10.10, GERD K21.9, Gas R14.0, Bloating R14.0, Screening for Colon Cancer Z12.11  Was patient informed to call insurance with codes (Y/N): Yes   Referral sent?:  Referral was sent at the time of electronic surgical scheduling.  EM or [facility] notified?: I sent an electronic request to Endo Scheduling and received a confirmation today.   Medication Orders: Patient is aware to hold Sildenafil 3 days prior to procedure. Patient is aware to NOT take iron pills, herbal meds and diet supplements for 7 days before exam. Also to NOT take any form of alcohol, recreational drugs and any forms of ED meds 24-72 hours before exam.   Misc Orders: N/A      Further instructions given by staff: I discussed the prep instructions with the patient which he verbally understood. Provided patient with prep instructions and cancellation policy at the time of office visit.

## 2024-05-11 LAB — H PYLORI BREATH TEST: NEGATIVE

## 2024-05-13 ENCOUNTER — TELEPHONE (OUTPATIENT)
Facility: CLINIC | Age: 64
End: 2024-05-13

## 2024-05-13 ENCOUNTER — TELEPHONE (OUTPATIENT)
Dept: INTERNAL MEDICINE CLINIC | Facility: CLINIC | Age: 64
End: 2024-05-13

## 2024-05-13 DIAGNOSIS — K21.9 GASTROESOPHAGEAL REFLUX DISEASE, UNSPECIFIED WHETHER ESOPHAGITIS PRESENT: Primary | ICD-10-CM

## 2024-05-13 RX ORDER — FAMOTIDINE 20 MG/1
20 TABLET, FILM COATED ORAL 2 TIMES DAILY PRN
Qty: 180 TABLET | Refills: 0 | Status: SHIPPED | OUTPATIENT
Start: 2024-05-13 | End: 2024-08-11

## 2024-05-13 NOTE — TELEPHONE ENCOUNTER
Patient called for results - (name and date of birth verified). Provider's results and recommendations reviewed with patient. Patient verbalizes understanding of the information, agrees with plan of care and offers no further questions at this time.   Your chemistry profile (glucose, electrolytes, kidney and liver function) is normal except borderline glucose     Your blood count is normal     Your glycohemoglobin level, an average of your blood glucose readings over the previous 2-3 months, is borderline at 5.9%     Your cholesterol profile is normal     PSA level, screening for prostate cancer, is normal     Fco, except for your borderline glucose and borderline glycohemoglobin, which indicate prediabetes, all other tests look fine.  Following a healthy diet and exercising regularly can help prevent diabetes   Written by Dudley Bradford MD on 5/9/2024 12:03 PM CDT

## 2024-05-13 NOTE — TELEPHONE ENCOUNTER
Verified name and .    Patient with further questions about hemoglobin A1C and what can be done to improve levels.    This RN gave some advice regarding healthy low carbohydrate diet and exercising as tolerated.    Patient verbalizes understanding and agrees with plan.        Component  Ref Range & Units 24  9:52 AM   HgbA1C  <5.7 % 5.9 High    Comment:  Normal HbA1C:     <5.7%   Pre-Diabetic:     5.7 - 6.4%   Diabetic:         >6.4%   Diabetic Control: <7.0%

## 2024-05-13 NOTE — TELEPHONE ENCOUNTER
Called & spoke with patient   -he is taking dicyclomine 2x daily and famotidine 2x daily and reports symptoms are controlled. No longer having acid regurgitation at night. He is feeling much improved. I refilled famotidine. I told him to let us know if symptoms worsen again and we would likely add back in PPI therapy. He also asked for prediabetic diet information and I sent him a link via [employer].

## 2024-05-16 NOTE — TELEPHONE ENCOUNTER
Patient called back, verified and message below given.  Patient voiced understanding.    Gen Claire, LACI         24  9:58 AM  Note     Called & spoke with patient   -he is taking dicyclomine 2x daily and famotidine 2x daily and reports symptoms are controlled. No longer having acid regurgitation at night. He is feeling much improved. I refilled famotidine. I told him to let us know if symptoms worsen again and we would likely add back in PPI therapy. He also asked for prediabetic diet information and I sent him a link via [employer].

## 2024-06-06 ENCOUNTER — TELEPHONE (OUTPATIENT)
Facility: CLINIC | Age: 64
End: 2024-06-06

## 2024-06-06 ENCOUNTER — HOSPITAL ENCOUNTER (OUTPATIENT)
Dept: ULTRASOUND IMAGING | Facility: HOSPITAL | Age: 64
Discharge: HOME | End: 2024-06-06
Payer: COMMERCIAL

## 2024-06-06 DIAGNOSIS — R10.10 UPPER ABDOMINAL PAIN: ICD-10-CM

## 2024-06-06 PROCEDURE — 76700 US EXAM ABDOM COMPLETE: CPT

## 2024-06-06 NOTE — TELEPHONE ENCOUNTER
Patient is returning RN's call.  See 6/6/24 signed telephone encounter.   Patient states that he can not access his P21t to review any messages.  He would like to be called at his home # and he states that someone keeps calling his wife's #.  He states that he is very worried about the results and request a call back today.  Please call

## 2024-06-06 NOTE — TELEPHONE ENCOUNTER
Camron Mishra ultrasound finding is consistent with fatty liver disease. Your blood work for liver function tests were all normal on May 9th. You should have your liver function tested once a year by myself or your primary care provider. Fatty liver disease is treated with lifestyle changes. Healthy diet to treat your prediabetes will be helpful.     Your imaging indicate that you have  Hepatic steatosis / fatty liver disease  -extra fat on the liver, without inflammation     Recommendations:  -weight loss goal: lose 5% of body weight gradually (1-2lb per week)  -avoid excess alcohol  -manage cholesterol (medication if prescribed by PCP, eat healthy, exercise)  -manage pre-diabetes to control blood glucose  -recheck liver function tests in 6-12 months        LACI Hayes        Patient contacted,  verified and message from Claire Blevins read to patient.  Patient voiced understanding.  No further questions at this time.

## 2024-06-06 NOTE — TELEPHONE ENCOUNTER
Left message to call back.    MyChart message from josr Blevins-  Camron Eagle     You ultrasound finding is consistent with fatty liver disease. Your blood work for liver function tests were all normal on May 9th. You should have your liver function tested once a year by myself or your primary care provider. Fatty liver disease is treated with lifestyle changes. Healthy diet to treat your prediabetes will be helpful.     Your imaging indicate that you have  Hepatic steatosis / fatty liver disease  -extra fat on the liver, without inflammation     Recommendations:  -weight loss goal: lose 5% of body weight gradually (1-2lb per week)  -avoid excess alcohol  -manage cholesterol (medication if prescribed by PCP, eat healthy, exercise)  -manage pre-diabetes to control blood glucose  -recheck liver function tests in 6-12 months        LACI Hayes

## 2024-06-06 NOTE — TELEPHONE ENCOUNTER
Patient calling for ultrasound results.  Please advise. Thank you.    FINDINGS:  LIVER: Coarse increased hepatic echotexture may be seen with fatty infiltration versus hepatocellular disease.  No focal hepatic lesions.  No significant masses. Color flow and Doppler imaging of portal and hepatic veins show patency and antegrade flow.  GALLBLADDER: Normal gallbladder ultrasound No calculi, wall thickening or pericholecystic fluid.  Sonographic Garvey's sign was not elicited.    BILE DUCTS: Common bile duct measures 3.6 mm.    PANCREAS: Suboptimal visualization partially obscured by bowel.    SPLEEN: Normal size and echotexture.  Spleen length measures 9.7 cm.  KIDNEYS: No mass, obstruction or obvious calculus.  The right kidney length measures 10.3 cm.  The left kidney length measures 11.3 cm.    AORTA/VASCULAR: Normal.  No aneurysm.  Patent IVC.    OTHER: Negative.  No ascites or adenopathy seen.                 Impression   CONCLUSION:  1. Hepatic steatosis versus diffuse hepatocellular disease.  No focal hepatic lesions.  2. Normal gallbladder ultrasound.  Normal common duct.  3. Suboptimal visualization pancreas partially obscured by bowel.  4. Remainder of the ultrasound upper abdomen complete is unremarkable .             Dictated by (CST): Tyler Black MD on 6/06/2024 at 11:38 AM      Finalized by (CST): Tyler Black MD on 6/06/2024 at 11:45 AM

## 2024-06-06 NOTE — TELEPHONE ENCOUNTER
Left detailed message on patient's voicemail that a message  was sent to Claire Blevins and we are waiting for a response.

## 2024-09-03 ENCOUNTER — TELEPHONE (OUTPATIENT)
Facility: CLINIC | Age: 64
End: 2024-09-03

## 2024-09-03 NOTE — TELEPHONE ENCOUNTER
Patient called to request the prescription for the famotidine 20 mg and is requesting to have the preparation for the procedure sent the [facility] in [place].

## 2024-09-04 RX ORDER — FAMOTIDINE 20 MG/1
20 TABLET, FILM COATED ORAL 2 TIMES DAILY
Qty: 180 TABLET | Refills: 2 | Status: SHIPPED | OUTPATIENT
Start: 2024-09-04

## 2024-09-04 NOTE — TELEPHONE ENCOUNTER
LOV with Claire Blevins 5/9/2024      Famotidine and bowel prep sent to patient's pharmacy per protocol

## 2024-09-12 ENCOUNTER — TELEPHONE (OUTPATIENT)
Facility: CLINIC | Age: 64
End: 2024-09-12

## 2024-09-12 NOTE — TELEPHONE ENCOUNTER
Patient calling to cancel 9/19/2024 colonoscopy & Esophagogastroduodenoscopy.  Please call.  Thank you.

## 2024-09-12 NOTE — TELEPHONE ENCOUNTER
Called patient - offered to reschedule his colonoscopy and EGD but states he wants to cancel for now since he has an upcoming procedure to remove his wisdom teeth and does not want to reschedule at this time. Patient will discuss rescheduling with Claire Blevins when he sees her in October for his follow up.    Please refer to telephone encounter dated 5/10/2024 for scheduling orders.    Telephone encounter closed.

## 2024-10-12 ENCOUNTER — TELEPHONE (OUTPATIENT)
Facility: CLINIC | Age: 64
End: 2024-10-12

## 2024-10-12 DIAGNOSIS — K21.9 GASTROESOPHAGEAL REFLUX DISEASE, UNSPECIFIED WHETHER ESOPHAGITIS PRESENT: ICD-10-CM

## 2024-10-12 DIAGNOSIS — R19.7 DIARRHEA, UNSPECIFIED TYPE: Primary | ICD-10-CM

## 2024-10-12 NOTE — TELEPHONE ENCOUNTER
Patient has follow up appt with GI. Patient has HMO insurance but there is no referral on file. Please advise.

## 2024-10-15 ENCOUNTER — TELEPHONE (OUTPATIENT)
Dept: INTERNAL MEDICINE CLINIC | Facility: CLINIC | Age: 64
End: 2024-10-15

## 2024-10-15 NOTE — TELEPHONE ENCOUNTER
Called spoke to spouse Martha (on file) informed referral was reviewed and signed by . Martha Stated will call office where referral is needed and make sure referral and appointment are still in place. No further questions from Martha.

## 2024-10-19 NOTE — PROGRESS NOTES
[facility] - Gastroenterology                                                                                                               Reason for consult: abd pain & diarrhea    Requesting physician or provider: Dudley Bradford MD    Chief Complaint   Patient presents with    Follow - Up       HPI:   Fco Lopez is a 64 year old year-old male with active diagnoses including aortc atherosclerosis, hypertension, hypercholesterolemia, left bundle branch block, cardiomyopathy. Prior medical/surgical history in table below.    Today:  #diarrhea - resolved  #GERD  -since prior visit he is doing much better. Symptoms are improving with taking famotidine and dicyclomine. He has 1-3 bowel movements of formed dark brown stool daily. Still breakthrough GERD but improving  -he is taking famotidine 20mg BID and dicyclomine 10mg twice daily   -was scheduled for EGD/colonoscopy 9/19 but canceled due to dental procedure    Prior visit 5/9/24:  #upper abdominal pain  #GERD  #gas  -frequent upper abdominal pain, GERD, gas, bloating & diarrhea x years, worse the past month  -takes prilosec daily for a couple years. If he skips a dose and goes to bed he regurgitates acid into mouth & nose  -4-5 bowel movements daily of diarrhea, type 6 on bristol stool chart. He reports black stools when he takes pepto bismol  -diet recall: breakfast: oatmeal, yogurt, banana // lunch: soup and crackers // dinner: chicken, sweet potatoes, apple sauce // snack: cookies, nuts // drinks: gatorade zero, water  -avoids dairy because causes worsening abdominal pain and diarrhea    Patient denies symptoms of nausea, vomiting, dysphagia, odynophagia, globus sensation, , hematemesis, change in bowel habits, constipation, hematochezia, or melena. he denies recent change in appetite, fever or unintentional weight loss.      Last colonoscopy: none   Last EGD: none      NSAIDS: ibuprofen occasional  Tobacco:  1/2 PPD  Alcohol: 3-4 beers a week  Marijuana: none   Illicit drugs: none     FH GI malignancy: Dad - stomach cancer  FH IBD: none     No history of adverse reaction to sedation  No WADE  No anticoagulants/antiplatelet  No pacemaker/defibrillator    Wt Readings from Last 6 Encounters:   10/22/24 210 lb (95.3 kg)   24 200 lb (90.7 kg)   24 200 lb 6 oz (90.9 kg)   23 202 lb 3.2 oz (91.7 kg)   04/15/23 195 lb (88.5 kg)   22 204 lb (92.5 kg)        History, Medications, Allergies, ROS:      Past Medical History:    Aortic atherosclerosis (HCC)    CXR     Dilated cardiomyopathy (HCC)    Echo  with EF 25-30%, mild MR; cardiac cath  without obstructive CAD; Echo  with EF 44%, mild MR    Essential hypertension    Hypercholesterolemia    Left bundle branch block    Prediabetes      Past Surgical History:   Procedure Laterality Date    Other      Laceration left wrist with ulnar nerve injury      Family Hx:   Family History   Problem Relation Age of Onset    Other (Stomach Cancer [Other]) Father          age 49    Hypertension Maternal Grandmother     Hypertension Maternal Aunt     Hypertension Maternal Uncle       Social History:   Social History     Socioeconomic History    Marital status:    Occupational History    Occupation:    Tobacco Use    Smoking status: Every Day     Current packs/day: 1.00     Average packs/day: 1 pack/day for 35.0 years (35.0 ttl pk-yrs)     Types: Cigarettes    Smokeless tobacco: Never    Tobacco comments:     Occasional   Vaping Use    Vaping status: Never Used   Substance and Sexual Activity    Alcohol use: Yes     Alcohol/week: 3.0 - 4.0 standard drinks of alcohol     Types: 3 - 4 Cans of beer per week     Comment: Occasional    Drug use: No   Other Topics Concern    Caffeine Concern Yes     Comment: coffee, 2 cups    Right Handed Yes     Social Drivers of Health     Financial Resource  Strain: Low Risk  (9/27/2021)    [facility]    Overall Financial Resource Strain (CARDIA)     Difficulty of Paying Living Expenses: Not hard at all   Transportation Needs: No Transportation Needs (9/27/2021)    [facility]    PRAPARE - Transportation     Lack of Transportation (Medical): No     Lack of Transportation (Non-Medical): No        Medications (Active prior to today's visit):  Current Outpatient Medications   Medication Sig Dispense Refill    famotidine 20 MG Oral Tab Take 1 tablet (20 mg total) by mouth 2 (two) times daily. 180 tablet 2    carvedilol 25 MG Oral Tab Take 1 tablet (25 mg total) by mouth 2 (two) times daily with meals.      omeprazole 20 MG Oral Capsule Delayed Release Take 1 capsule (20 mg total) by mouth at bedtime.      rosuvastatin 20 MG Oral Tab Take 1 tablet (20 mg total) by mouth daily.      sacubitril-valsartan (ENTRESTO) 24-26 MG Oral Tab Take 1 tablet by mouth 2 (two) times daily.      aspirin 81 MG Oral Chew Tab Chew 1 tablet (81 mg total) by mouth daily. 30 tablet 0    Sildenafil Citrate 50 MG Oral Tab Take 1 tablet (50 mg total) by mouth daily as needed for Erectile Dysfunction. (Patient not taking: Reported on 10/22/2024) 8 tablet 2       Allergies:  No Known Allergies    ROS:   CONSTITUTIONAL: negative for fevers, chills, sweats  EYES Negative for scleral icterus or redness, and diplopia  HEENT: Negative for hoarseness  RESPIRATORY: Negative for cough and severe shortness of breath  CARDIOVASCULAR: Negative for crushing sub-sternal chest pain  GASTROINTESTINAL: See HPI  GENITOURINARY: Negative for dysuria  MUSCULOSKELETAL: Negative for arthralgias and myalgias  SKIN: Negative for jaundice, rash or pruritus  NEUROLOGICAL: Negative for dizziness and headaches  BEHAVIOR/PSYCH: Negative for psychotic behavior    PHYSICAL EXAM:   Blood pressure 115/74, pulse 66, height 6' 1\" (1.854 m), weight  210 lb (95.3 kg).    GEN: Alert, no acute distress, well-nourished   HEENT: anicteric sclera, neck supple, trachea midline, MMM, no palpable or tender neck or supraclavicular lymph nodes  CV: RRR, the extremities are warm and well perfused   LUNGS: No increased work of breathing, CTAB  ABDOMEN: Soft, symmetrical, non-tender without distention or guarding. No scars. Aorta is without bruit or visible pulsation. Umbilicus is midline without herniation. Normoactive bowel sounds are present, No masses, hepatomegaly or splenomegaly noted. +nevi  MSK: No erythema, no warmth, no swelling of joints  SKIN: No jaundice, no erythema, no rashes, no lesions  HEMATOLOGIC: No bleeding, no bruising  NEURO: Alert and interactive, JOINER  PSYCH: appropriate mood & affect    Labs/Imaging/Procedures:     Patient's pertinent labs and imaging were reviewed and discussed with patient today.        .  ASSESSMENT/PLAN:   Fco Lopez is a 64 year old year-old male with active diagnoses including aortc atherosclerosis, hypertension, hypercholesterolemia, left bundle branch block, cardiomyopathy. Prior medical/surgical history in table below.    Today:  #diarrhea - resolved  #GERD  -since prior visit he is doing much better. Symptoms are improving with taking famotidine and dicyclomine. He has 1-3 bowel movements of formed dark brown stool daily. Still breakthrough GERD but improving  -he is taking famotidine 20mg BID and dicyclomine 10mg twice daily   -was scheduled for EGD/colonoscopy 9/19 but canceled due to dental procedure    -etiology likely multifactorial including GERD, food intolerance and IBS-diarrhea. Has never had EGD or CLN. Does have family hx of stomach CA. Risk factors male, tobacco use, >50 year old, refractory GERD    #fatty liver  -fatty liver on ultrasound. Last LFTs in May WNL. Risk factors obesity, HTN, HLD. Advised on management of comorbidities, healthy lifestyle and weight loss. Follow up yearly    Recommendations:  -can  continue famotidine (pepcid) 20mg twice daily. This is an acid reducer.     -can continue dicyclomine (bentyl) 10mg twice daily. This is an antispasmodic.     -can take metamucil fiber capsules. This is fiber supplement.     -follow up yearly for fatty liver disease    -------------------------------------------------------------------------------------------------  1. Schedule EGD & colonoscopy with General Pool Endoscopist  Diagnosis: CRC screen, GERD  Sedation: MAC  Prep: split dose golytely    Endoscopy risk/benefit discussion: I have thoroughly discussed the risks, benefits, and alternatives of endoscopic evaluation with the patient, who demonstrated understanding. This includes the potential risks of bleeding, infection, pain, anesthesia complications, and perforation, which may result in prolonged hospitalization or surgical intervention. All of the patient’s questions were addressed to their satisfaction. The patient has chosen to proceed with the endoscopic procedure, including any necessary interventions such as polypectomy, biopsy, control of bleeding.          Orders This Visit:  No orders of the defined types were placed in this encounter.      Meds This Visit:  Requested Prescriptions      No prescriptions requested or ordered in this encounter       Imaging & Referrals:  None      LACI Hayes    [facility] Gastroenterology  10/22/2024        This note was partially prepared using Dragon Medical voice recognition dictation software. As a result, errors may occur. When identified, these errors have been corrected. While every attempt is made to correct errors during dictation, discrepancies may still exist.

## 2024-10-22 ENCOUNTER — OFFICE VISIT (OUTPATIENT)
Facility: CLINIC | Age: 64
End: 2024-10-22

## 2024-10-22 ENCOUNTER — TELEPHONE (OUTPATIENT)
Facility: CLINIC | Age: 64
End: 2024-10-22

## 2024-10-22 VITALS
BODY MASS INDEX: 27.83 KG/M2 | HEIGHT: 73 IN | SYSTOLIC BLOOD PRESSURE: 115 MMHG | DIASTOLIC BLOOD PRESSURE: 74 MMHG | HEART RATE: 66 BPM | WEIGHT: 210 LBS

## 2024-10-22 DIAGNOSIS — K21.9 GASTROESOPHAGEAL REFLUX DISEASE, UNSPECIFIED WHETHER ESOPHAGITIS PRESENT: ICD-10-CM

## 2024-10-22 DIAGNOSIS — Z12.11 COLON CANCER SCREENING: Primary | ICD-10-CM

## 2024-10-22 DIAGNOSIS — R19.7 DIARRHEA, UNSPECIFIED TYPE: ICD-10-CM

## 2024-10-22 DIAGNOSIS — K76.0 FATTY LIVER: ICD-10-CM

## 2024-10-22 PROCEDURE — 3008F BODY MASS INDEX DOCD: CPT

## 2024-10-22 PROCEDURE — 99214 OFFICE O/P EST MOD 30 MIN: CPT

## 2024-10-22 PROCEDURE — 3074F SYST BP LT 130 MM HG: CPT

## 2024-10-22 PROCEDURE — 3078F DIAST BP <80 MM HG: CPT

## 2024-10-22 NOTE — TELEPHONE ENCOUNTER
Scheduled for:  Colonoscopy 65224, EGD 57018  Provider Name:  Dr. Palacios   Date:  2/18/2025  Location:[place]  Sedation:  MAC  Time:  (pt is aware that ENDO will call the day before to confirm arrival time)    Prep:  Golytely  Meds/Allergies Reconciled?:  LACI Hayes Reviewed  Diagnosis with codes:    Colon Screening Z12.11  GERD K21.9  Was patient informed to call insurance with codes (Y/N):  Yes  Referral sent?:  Referral was sent at the time of electronic surgical scheduling.  EM or [facility] notified?:  I sent an electronic request to Endo Scheduling and received a confirmation today.  Medication Orders:  Pt is aware to NOT take iron pills, herbal meds and diet supplements for 7 days before exam. Also to NOT take any form of alcohol, recreational drugs and any forms of ED meds 24 hours before exam.   Misc Orders:       Further instructions given by staff:  I provide prep instructions to patient at the time of the appointment and reviewed date, time and location, he verbalized that he understood and is aware to call if he has any questions.

## 2024-10-22 NOTE — PATIENT INSTRUCTIONS
-can continue famotidine (pepcid) 20mg twice daily. This is an acid reducer.     -can continue dicyclomine (bentyl) 10mg twice daily. This is an antispasmodic.     -can take metamucil fiber capsules. This is fiber supplement.     -follow up yearly for fatty liver disease    -------------------------------------------------------------------------------------------------  1. Schedule EGD & colonoscopy with General Pool Endoscopist  Diagnosis: CRC screen, GERD  Sedation: MAC  Prep: split dose golytely    2.  bowel prep from pharmacy   You can pick the bowel prep up now and store in a cool, dry place in your home until your scheduled bowel prep start date.    3. Continue all medications as normal for your procedure. DO NOT TAKE: Any form of alcohol, recreational drugs and any forms of erectile dysfunction medications 24 hours prior to procedure.    4. Read all bowel prep instructions carefully. Bowel prep instructions can also be found online at:  www.[facility].org/giprep     5. AVOID seeds, nuts, popcorn, raw fruits and vegetables for 5 days before procedure    6. If you start any NEW medication after your visit today, please notify us. Certain medications (like iron or weight loss medications) will need to be held before the procedure, or the procedure cannot be performed safely.

## 2024-11-19 ENCOUNTER — TELEPHONE (OUTPATIENT)
Facility: CLINIC | Age: 64
End: 2024-11-19

## 2024-11-19 DIAGNOSIS — R19.7 DIARRHEA, UNSPECIFIED TYPE: Primary | ICD-10-CM

## 2024-11-20 RX ORDER — DICYCLOMINE HYDROCHLORIDE 10 MG/1
10 CAPSULE ORAL 2 TIMES DAILY
Qty: 180 CAPSULE | Refills: 3 | Status: SHIPPED | OUTPATIENT
Start: 2024-11-20 | End: 2025-11-15

## 2024-11-20 NOTE — TELEPHONE ENCOUNTER
Camron Rangel    I am unable to refill Dicyclomine per protocol.    Previous prescription was for QID but per office visit notes he can take BID.    Last office visit: 10/22/2024    Last refill: 5/9/2024     D: 120     R:2    Thank you

## 2025-02-17 ENCOUNTER — TELEPHONE (OUTPATIENT)
Facility: CLINIC | Age: 65
End: 2025-02-17

## 2025-04-10 ENCOUNTER — TELEPHONE (OUTPATIENT)
Dept: INTERNAL MEDICINE CLINIC | Facility: CLINIC | Age: 65
End: 2025-04-10

## 2025-04-10 RX ORDER — CALCIUM CARBONATE 500 MG/1
1 TABLET, CHEWABLE ORAL NIGHTLY PRN
COMMUNITY

## 2025-04-10 RX ORDER — FAMOTIDINE 20 MG/1
20 TABLET, FILM COATED ORAL DAILY
COMMUNITY

## 2025-04-10 NOTE — TELEPHONE ENCOUNTER
COMPREHENSIVE MEDICATION REVIEW         Fco Lopez MRN [id number]    1960 PCP Aline Borges MD     Comments: Medication history completed by Ambulatory Clinic Pharmacist with spouse, Martha (ok per HIPAA) over the phone on 4/10/25. Patient has upcoming AWV with PCP on 25.     After thorough medication review, 5 discrepancies have been identified and corrected on patient's medication list. See updated list below:     Outpatient Encounter Medications as of 4/10/2025   Medication Sig    famotidine 20 MG Oral Tab Take 1 tablet (20 mg total) by mouth in the morning.    calcium carbonate 500 MG Oral Chew Tab Chew 1 tablet (500 mg total) by mouth nightly as needed for Heartburn.    dicyclomine 10 MG Oral Cap Take 1 capsule (10 mg total) by mouth in the morning and 1 capsule (10 mg total) before bedtime. (Patient taking differently: Take 1 capsule (10 mg total) by mouth daily.)    carvedilol 25 MG Oral Tab Take 1 tablet (25 mg total) by mouth 2 (two) times daily with meals.    rosuvastatin 20 MG Oral Tab Take 1 tablet (20 mg total) by mouth nightly.    sacubitril-valsartan (ENTRESTO) 24-26 MG Oral Tab Take 1 tablet by mouth 2 (two) times daily.    aspirin 81 MG Oral Chew Tab Chew 1 tablet (81 mg total) by mouth daily.     Medication Assessment:   Reviewed all medications in detail with spouse including dose, indication, timing of administration, monitoring parameters, and potential side effects of medications.     Spouse reports patient is taking carvedilol 25 mg twice daily and Entresto 24-26 mg twice daily as prescribed. Patient does monitor his blood pressure regularly at home. Did instruct spouse to bring in any recent blood pressure readings to PCP appointment for review.     Did provide education and stressed the importance of taking medication just like prescribed to get the most benefit. Spouse denies patient ever forgetting or missing medication doses and denies any questions or concerns with  medications at this time.     Thank you,    Jodie Berrios, PharmD, 4/10/2025, 9:45 AM

## 2025-04-11 ENCOUNTER — OFFICE VISIT (OUTPATIENT)
Dept: INTERNAL MEDICINE CLINIC | Facility: CLINIC | Age: 65
End: 2025-04-11

## 2025-04-11 VITALS
RESPIRATION RATE: 16 BRPM | OXYGEN SATURATION: 97 % | DIASTOLIC BLOOD PRESSURE: 66 MMHG | HEART RATE: 77 BPM | TEMPERATURE: 98 F | SYSTOLIC BLOOD PRESSURE: 120 MMHG | BODY MASS INDEX: 26.51 KG/M2 | WEIGHT: 200 LBS | HEIGHT: 73 IN

## 2025-04-11 DIAGNOSIS — I10 PRIMARY HYPERTENSION: Primary | ICD-10-CM

## 2025-04-11 DIAGNOSIS — F17.210 CIGARETTE NICOTINE DEPENDENCE WITHOUT COMPLICATION: ICD-10-CM

## 2025-04-11 DIAGNOSIS — I44.7 LEFT BUNDLE BRANCH BLOCK: ICD-10-CM

## 2025-04-11 DIAGNOSIS — E78.00 HYPERCHOLESTEROLEMIA: ICD-10-CM

## 2025-04-11 DIAGNOSIS — I42.0 DILATED CARDIOMYOPATHY (HCC): ICD-10-CM

## 2025-04-11 DIAGNOSIS — K21.9 GASTROESOPHAGEAL REFLUX DISEASE WITHOUT ESOPHAGITIS: ICD-10-CM

## 2025-04-11 DIAGNOSIS — I70.0 AORTIC ATHEROSCLEROSIS: ICD-10-CM

## 2025-04-11 DIAGNOSIS — R73.03 PREDIABETES: ICD-10-CM

## 2025-04-11 DIAGNOSIS — Z12.11 COLON CANCER SCREENING: ICD-10-CM

## 2025-04-11 PROBLEM — M67.431 GANGLION CYST OF VOLAR ASPECT OF RIGHT WRIST: Status: RESOLVED | Noted: 2021-09-02 | Resolved: 2025-04-11

## 2025-04-11 PROBLEM — R00.2 PALPITATIONS: Status: RESOLVED | Noted: 2021-09-23 | Resolved: 2025-04-11

## 2025-04-11 PROCEDURE — 99214 OFFICE O/P EST MOD 30 MIN: CPT | Performed by: INTERNAL MEDICINE

## 2025-04-11 PROCEDURE — 3008F BODY MASS INDEX DOCD: CPT | Performed by: INTERNAL MEDICINE

## 2025-04-11 PROCEDURE — 99406 BEHAV CHNG SMOKING 3-10 MIN: CPT | Performed by: INTERNAL MEDICINE

## 2025-04-11 PROCEDURE — 3074F SYST BP LT 130 MM HG: CPT | Performed by: INTERNAL MEDICINE

## 2025-04-11 PROCEDURE — 3078F DIAST BP <80 MM HG: CPT | Performed by: INTERNAL MEDICINE

## 2025-04-11 PROCEDURE — G2211 COMPLEX E/M VISIT ADD ON: HCPCS | Performed by: INTERNAL MEDICINE

## 2025-04-11 NOTE — PROGRESS NOTES
The following individual(s) verbally consented to be recorded using ambient AI listening technology and understand that they can each withdraw their consent to this listening technology at any point by asking the clinician to turn off or pause the recording:     Patient name: abhi nicoleucet   Additional names: bobby ritchie

## 2025-04-11 NOTE — PATIENT INSTRUCTIONS
Quitting Smoking    Quitting smoking is the most important step you can take to improve your health. We're glad you have set a goal to improve your health.    Quit Smoking Resources    In addition to medications, use the STAR plan to help you successfully quit.   Stick with your quit date!   Tell friends, family, and coworkers your quit date. Request their understanding and support.  Anticipate and prepare for challenges. Some examples are withdrawal symptoms, being around others who smoke, and drinking alcohol.  Remove all tobacco products and paraphernalia from your environment. Make your home and vehicles smoke-free.    Free resources for additional support:  National tobacco quitline: 1-800-QUIT-NOW (1-311.420.1507).  SmokefreeTXT is a free text program to assist you in quitting. Visit https://www.smokefree.gov/smokefreetxt for more information.  Feel free to call your care manager at ([phone]) for additional support.

## 2025-04-11 NOTE — PROGRESS NOTES
Patient ID: Fco Lopez is a 64 year old male.  Chief Complaint   Patient presents with    New Patient    Referral     For [place] cardiology- Dr mally hoang & Claire Blevins with gastro        HISTORY OF PRESENT ILLNESS:   HPI  History of Present Illness  Fco Lopez is a 64 year old male with hypertension and heart failure who presents to [facility] care after his previous doctor retired.    He has a history of hypertension and heart failure, managed with carvedilol and Entresto. Approximately five years ago, he experienced severe shakiness and was hospitalized, where it was discovered that the left side of his heart was functioning at 23%. Since then, he has been under the care of a cardiologist and has undergone four echocardiograms, with the last one being about in 2022.  Most recent EF was 44%     He also has a history of high cholesterol, for which he takes rosuvastatin. This medication was initiated due to his cardiac issues.    He has been informed of prediabetes, which was noted during his last physical. Dietary control is being used to manage this condition.    He experiences gastrointestinal issues, including diarrhea, which he associates with taking his heart medications on an empty stomach. He takes dicyclomine for stomach issues and famotidine for heartburn. Taking his medications with food helps alleviate some of the symptoms. He has a history of a 'touchy stomach' and experiences symptoms consistent with irritable bowel syndrome, such as diarrhea following meals and certain foods triggering heartburn. He is scheduled for a colonoscopy on May 27th and has had an abdominal ultrasound that showed a slightly fatty liver but was otherwise normal.    He smokes about half a pack of cigarettes a day and has been smoking since he was 19 years old.    Review of Systems  Ten point review of systems otherwise negative with the exception of HPI and assessment and plan.    MEDICAL HISTORY:   Past Medical  History[1]    Past Surgical History[2]    Medications - Current[3]    Allergies:Allergies[4]    Social History     Socioeconomic History    Marital status:      Spouse name: Not on file    Number of children: Not on file    Years of education: Not on file    Highest education level: Not on file   Occupational History    Occupation:    Tobacco Use    Smoking status: Every Day     Current packs/day: 1.00     Average packs/day: 1 pack/day for 35.0 years (35.0 ttl pk-yrs)     Types: Cigarettes, Cigars    Smokeless tobacco: Never    Tobacco comments:     Occasional   Vaping Use    Vaping status: Never Used   Substance and Sexual Activity    Alcohol use: Yes     Alcohol/week: 3.0 - 4.0 standard drinks of alcohol     Types: 3 - 4 Cans of beer per week     Comment: Occasional    Drug use: No    Sexual activity: Not on file   Other Topics Concern     Service Not Asked    Blood Transfusions Not Asked    Caffeine Concern Yes     Comment: coffee, 2 cups    Occupational Exposure Not Asked    Hobby Hazards Not Asked    Sleep Concern Not Asked    Stress Concern Not Asked    Weight Concern Not Asked    Special Diet Not Asked    Back Care Not Asked    Exercise Not Asked    Bike Helmet Not Asked    Seat Belt Not Asked    Self-Exams Not Asked    Left Handed Not Asked    Right Handed Yes    Currently spends a great deal of time in the sun Not Asked    Past Sunlamp Treatments for Acne Not Asked    History of tanning Not Asked    Hx of Spending Great Deal of Time in Sun Not Asked    Bad sunburns in the past Not Asked    Tanning Salons in the Past Not Asked    Hx of Radiation Treatments Not Asked    Regular use of sun block Not Asked   Social History Narrative    Not on file     Social Drivers of Health     Food Insecurity: Not on file   Transportation Needs: No Transportation Needs (9/27/2021)    Received from [facility]    PRAPARE - Transportation     Lack of Transportation (Medical): No     Lack of  Transportation (Non-Medical): No   Stress: Not on file   Housing Stability: Not on file           PHYSICAL EXAM:     Vitals:    04/11/25 0810   BP: 120/66   Pulse: 77   Resp: 16   Temp: 97.7 °F (36.5 °C)   TempSrc: Temporal   SpO2: 97%   Weight: 200 lb (90.7 kg)   Height: 6' 1\" (1.854 m)       Body mass index is 26.39 kg/m².    Physical Exam  Constitutional:       Appearance: Normal appearance.   Eyes:      General: No scleral icterus.  Cardiovascular:      Rate and Rhythm: Normal rate and regular rhythm.      Pulses: Normal pulses.      Heart sounds: Normal heart sounds. No murmur heard.  Pulmonary:      Effort: Pulmonary effort is normal. No respiratory distress.      Breath sounds: Normal breath sounds. No stridor. No wheezing or rhonchi.   Abdominal:      General: Abdomen is flat. Bowel sounds are normal.      Palpations: Abdomen is soft.   Neurological:      Mental Status: He is alert.   Psychiatric:         Mood and Affect: Mood normal.         Behavior: Behavior normal.           ASSESSMENT/PLAN:   1. Primary hypertension  Well-controlled.  Continue medications.  Encouraged to stop smoking.    2. Hypercholesterolemia  Continue rosuvastatin.  Will check labs at his complete physical in 3 months.    3. Dilated cardiomyopathy (HCC)  Cardio Referral - Internal  Encouraged to stop smoking.  Continue Entresto.    4. Aortic atherosclerosis  Cardio Referral - Internal  We will check lipid panel in 3 months at his complete physical.    5. Left bundle branch block  Cardio Referral - Internal    6. Prediabetes  Diabetic diet.  Will check A1c at his complete physical.    7. Cigarette nicotine dependence without complication  Tobacco Use Counseling 3-10 Min [10930]  3 minutes spent discussing the benefits of quitting tobacco use.  Handout given on effective ways to stop smoking.    8. Gastroesophageal reflux disease without esophagitis  - Gastro Referral - In Network    9. Colon cancer screening  - Gastro Referral - In  Network    Return in about 3 months (around 7/11/2025) for Complete physical.    This note was prepared using Dragon Medical voice recognition dictation software. As a result errors may occur. When identified these errors have been corrected. While every attempt is made to correct errors during dictation discrepancies may still exist.    Miles Martinez MD  4/11/2025    Tobacco cessation counseling for 3-10 minutes (add E/M code #13979).         [1]   Past Medical History:   Aortic atherosclerosis    CXR 1-18    Back problem    SCIATICA PAIN SOMETIMES    Dilated cardiomyopathy (HCC)    Echo 9-21 with EF 25-30%, mild MR; cardiac cath 9-21 without obstructive CAD; Echo 9-22 with EF 44%, mild MR    Disorder of liver    FATTY LIVER    Esophageal reflux    Essential hypertension    High blood pressure    High cholesterol    Hypercholesterolemia    Left bundle branch block    Prediabetes    Visual impairment    READING GLASSES   [2]   Past Surgical History:  Procedure Laterality Date    Other  1980s    Laceration left wrist with ulnar nerve injury   [3]   Current Outpatient Medications:     famotidine 20 MG Oral Tab, Take 1 tablet (20 mg total) by mouth in the morning., Disp: , Rfl:     calcium carbonate 500 MG Oral Chew Tab, Chew 1 tablet (500 mg total) by mouth nightly as needed for Heartburn., Disp: , Rfl:     dicyclomine 10 MG Oral Cap, Take 1 capsule (10 mg total) by mouth in the morning and 1 capsule (10 mg total) before bedtime. (Patient taking differently: Take 1 capsule (10 mg total) by mouth daily.), Disp: 180 capsule, Rfl: 3    carvedilol 25 MG Oral Tab, Take 1 tablet (25 mg total) by mouth 2 (two) times daily with meals., Disp: , Rfl:     rosuvastatin 20 MG Oral Tab, Take 1 tablet (20 mg total) by mouth nightly., Disp: , Rfl:     sacubitril-valsartan (ENTRESTO) 24-26 MG Oral Tab, Take 1 tablet by mouth 2 (two) times daily., Disp: , Rfl:     aspirin 81 MG Oral Chew Tab, Chew 1 tablet (81 mg total) by mouth daily.,  Disp: 30 tablet, Rfl: 0  [4] No Known Allergies

## 2025-05-19 ENCOUNTER — TELEPHONE (OUTPATIENT)
Facility: CLINIC | Age: 65
End: 2025-05-19

## 2025-05-19 NOTE — TELEPHONE ENCOUNTER
Patient calling to cancel 5/27/2025 colonoscopy and Esophagogastroduodenoscopy -  patient will call back to reschedule.  Thank you.

## 2025-06-30 ENCOUNTER — HOSPITAL ENCOUNTER (OUTPATIENT)
Dept: GENERAL RADIOLOGY | Facility: HOSPITAL | Age: 65
Discharge: HOME | End: 2025-06-30
Attending: PHYSICAL MEDICINE & REHABILITATION
Payer: COMMERCIAL

## 2025-06-30 ENCOUNTER — OFFICE VISIT (OUTPATIENT)
Dept: PHYSICAL MEDICINE AND REHAB | Facility: CLINIC | Age: 65
End: 2025-06-30
Payer: COMMERCIAL

## 2025-06-30 VITALS
WEIGHT: 200 LBS | BODY MASS INDEX: 26.51 KG/M2 | HEIGHT: 73 IN | DIASTOLIC BLOOD PRESSURE: 79 MMHG | SYSTOLIC BLOOD PRESSURE: 131 MMHG

## 2025-06-30 DIAGNOSIS — M54.59 MECHANICAL LOW BACK PAIN: ICD-10-CM

## 2025-06-30 DIAGNOSIS — M47.816 FACET SYNDROME, LUMBAR: ICD-10-CM

## 2025-06-30 DIAGNOSIS — M99.9 BIOMECHANICAL LESION: ICD-10-CM

## 2025-06-30 DIAGNOSIS — M54.16 LUMBAR RADICULOPATHY: ICD-10-CM

## 2025-06-30 DIAGNOSIS — M51.369 BULGE OF LUMBAR DISC WITHOUT MYELOPATHY: ICD-10-CM

## 2025-06-30 DIAGNOSIS — M79.10 MYALGIA: ICD-10-CM

## 2025-06-30 DIAGNOSIS — M99.9 BIOMECHANICAL LESION: Primary | ICD-10-CM

## 2025-06-30 DIAGNOSIS — M48.062 SPINAL STENOSIS OF LUMBAR REGION WITH NEUROGENIC CLAUDICATION: ICD-10-CM

## 2025-06-30 DIAGNOSIS — I10 ESSENTIAL HYPERTENSION: ICD-10-CM

## 2025-06-30 DIAGNOSIS — M48.061 LUMBAR FORAMINAL STENOSIS: ICD-10-CM

## 2025-06-30 DIAGNOSIS — M47.816 LUMBAR SPONDYLOSIS: ICD-10-CM

## 2025-06-30 DIAGNOSIS — I42.0 DILATED CARDIOMYOPATHY (HCC): ICD-10-CM

## 2025-06-30 DIAGNOSIS — E78.5 HYPERLIPIDEMIA, UNSPECIFIED HYPERLIPIDEMIA TYPE: ICD-10-CM

## 2025-06-30 PROCEDURE — 99204 OFFICE O/P NEW MOD 45 MIN: CPT | Performed by: PHYSICAL MEDICINE & REHABILITATION

## 2025-06-30 PROCEDURE — 3078F DIAST BP <80 MM HG: CPT | Performed by: PHYSICAL MEDICINE & REHABILITATION

## 2025-06-30 PROCEDURE — 3008F BODY MASS INDEX DOCD: CPT | Performed by: PHYSICAL MEDICINE & REHABILITATION

## 2025-06-30 PROCEDURE — 3075F SYST BP GE 130 - 139MM HG: CPT | Performed by: PHYSICAL MEDICINE & REHABILITATION

## 2025-06-30 PROCEDURE — 72110 X-RAY EXAM L-2 SPINE 4/>VWS: CPT | Performed by: PHYSICAL MEDICINE & REHABILITATION

## 2025-06-30 RX ORDER — NAPROXEN 500 MG/1
500 TABLET ORAL 2 TIMES DAILY WITH MEALS
Qty: 60 TABLET | Refills: 0 | Status: SHIPPED | OUTPATIENT
Start: 2025-06-30

## 2025-06-30 RX ORDER — GABAPENTIN 100 MG/1
100 CAPSULE ORAL 3 TIMES DAILY
Qty: 90 CAPSULE | Refills: 0 | Status: SHIPPED | OUTPATIENT
Start: 2025-06-30

## 2025-06-30 NOTE — PATIENT INSTRUCTIONS
1) Take Naprosyn 500 mg 1 tablet twice per day with food for the next two weeks and then as needed but no more than 2 tablets per day. Do not take with any other NSAIDS (Ibuprofen, Advil, Aleve, etc). OK to take Tylenol 500 mg every 6 hours as needed for pain. If you develop any side effects including stomach aches, nausea, vomiting, or other gastrointestinal symptoms, stop the medication and call my office.   2) Please begin physical therapy as soon as possible.   3) Tylenol 500-1000 mg every 6-8 hours as needed for pain.  No more than 3000 mg daily.  4) Please get X-rays of the Lumbar spine today on your way out.   5) Start gabapentin 100 mg three times per day. If limited improvement, then would increase to 200 mg three times per day.  6) Follow-up with me or Ofe in 6 to 8 weeks after you  begin physical therapy. If symptoms do not improve, then I would recommend an MRI of the Lumbar spine

## 2025-06-30 NOTE — PROGRESS NOTES
The following individual(s) verbally consented to be recorded using ambient AI listening technology and understand that they can each withdraw their consent to this listening technology at any point by asking the clinician to turn off or pause the recording:    Patient name: Fco Lopez  Additional names:

## 2025-06-30 NOTE — H&P
[facility]  Clinic H&P    Requesting Physician: Miles Martinez MD    CHIEF COMPLAINT:    Chief Complaint   Patient presents with    New Patient     Patient presents here today to establish care, patient was referred by Dr. Martinez to evaluate and treat back pain. Patient reports pain 6/10. Admits weakness and N/T in legs, wears back brace to help.   No imaging available.  Patient gives verbal consent to use Abridge.        History of Present Illness  Fco Lopez is a 64 year old male with degenerative disc disease and arthritis who presents with worsening back pain and right leg numbness.    He has a history of degenerative disc disease and arthritis diagnosed six years ago. Over the past three weeks, his back pain has worsened, making it difficult for him to stand straight. He uses a brace most of the day, which provides some relief. The pain is rated as 5-6 out of 10 and is described as intermittent but more frequent than before. He has not been taking any medication for the pain, relying instead on ice and home remedies. The pain worsens with prolonged sitting, particularly while driving, and using a shopping cart for support helps because he is able to rest himself.    He experiences numbness in the top of his right foot, which has been occurring for about six months, especially after sitting in the car for extended periods. The numbness extends to the first and second toes of the right foot. He also experiences weakness in both legs after walking for long periods, describing it as 'walking in quicksand'. No numbness in toes other than the first and second of the right foot.    He has a history of not following through with recommended physical therapy due to scheduling conflicts with his previous work as a . He is now retired and remains active, riding his motorcycle and avoiding prolonged periods of inactivity. He has recently purchased a better mattress  to aid with his back issues.       PAST MEDICAL HISTORY:  Past Medical History[1]    SURGICAL HISTORY:  Past Surgical History[2]    SOCIAL HISTORY:   Social History     Occupational History    Occupation:    Tobacco Use    Smoking status: Every Day     Current packs/day: 1.00     Average packs/day: 1 pack/day for 35.0 years (35.0 ttl pk-yrs)     Types: Cigarettes, Cigars    Smokeless tobacco: Never    Tobacco comments:     Occasional   Vaping Use    Vaping status: Never Used   Substance and Sexual Activity    Alcohol use: Yes     Alcohol/week: 3.0 - 4.0 standard drinks of alcohol     Types: 3 - 4 Cans of beer per week     Comment: Occasional    Drug use: No    Sexual activity: Not on file       FAMILY HISTORY:   Family History[3]    CURRENT MEDICATIONS:   Current Medications[4]    ALLERGIES:   Allergies[5]    REVIEW OF SYSTEMS:   Review of Systems   Constitutional: Negative.    HENT: Negative.    Eyes: Negative.    Respiratory: Negative.    Cardiovascular: Negative.    Gastrointestinal: Negative.    Genitourinary: Negative.    Musculoskeletal: As per HPI   Skin: Negative.    Neurological: As per HPI  Endo/Heme/Allergies: Negative.    Psychiatric/Behavioral: Negative.      All other systems reviewed and are negative. Pertinent positives and negatives noted in the HPI.      PHYSICAL EXAM:   /79   Ht 73\"   Wt 200 lb (90.7 kg)   BMI 26.39 kg/m²     Body mass index is 26.39 kg/m².    General: No immediate distress  Head: Normocephalic/ Atraumatic  Eyes: Extra-occular movements intact.   Ears: No auricular hematoma or deformities  Mouth: No lesions or ulcerations  Heart: peripheral pulses intact. Normal capillary refill.   Lungs: Non-labored respirations  Abdomen: No abdominal guarding  Extremities: No lower extremity edema bilaterally   Skin: No lesions noted.   Cognition: alert & oriented x 3, attentive, able to follow 2 step commands, comprehension intact, spontaneous speech  intact  Motor:    Musculoskeletal:    LUMBAR SPINE:  Inspection: no erythema, swelling, or obvious deformity.  Their iliac crest and shoulder heights are symmetrical.  Postural exam reveals no scoliosis or kyphosis.  Palpation: Tender to palpation over the right lower lumbar facets and paraspinals  ROM: Active range of motion of the lumbar spine with discomfort during extension  Motor: 5/5 in all myotomes of the BILATERAL lower extremities 4 out of 5 during right great toe extension  Sensation: Intact to light touch in all dermatomes of the lower extremities   Reflexes: 2/4 at L4 and S1  Facet Loading: Negative  Straight leg raise: negative for radicular pain symptoms  Slump test: negative for pain symptoms or radicular pain symptoms      Gait:  Normal    Data  No visits with results within 6 Month(s) from this visit.   Latest known visit with results is:   [facility] Lab Encounter on 05/09/2024   Component Date Value Ref Range Status    Glucose 05/09/2024 108 (H)  70 - 99 mg/dL Final    Sodium 05/09/2024 142  136 - 145 mmol/L Final    Potassium 05/09/2024 5.0  3.5 - 5.1 mmol/L Final    Chloride 05/09/2024 110  98 - 112 mmol/L Final    CO2 05/09/2024 28.0  21.0 - 32.0 mmol/L Final    Anion Gap 05/09/2024 4  0 - 18 mmol/L Final    BUN 05/09/2024 10  9 - 23 mg/dL Final    Creatinine 05/09/2024 1.00  0.70 - 1.30 mg/dL Final    BUN/CREA Ratio 05/09/2024 10.0  10.0 - 20.0 Final    Calcium, Total 05/09/2024 9.5  8.7 - 10.4 mg/dL Final    Calculated Osmolality 05/09/2024 294  275 - 295 mOsm/kg Final    eGFR-Cr 05/09/2024 84  >=60 mL/min/1.73m2 Final    ALT 05/09/2024 18  10 - 49 U/L Final    AST 05/09/2024 20  <=34 U/L Final    Alkaline Phosphatase 05/09/2024 57  45 - 117 U/L Final    Bilirubin, Total 05/09/2024 0.6  0.2 - 1.1 mg/dL Final    Total Protein 05/09/2024 7.1  5.7 - 8.2 g/dL Final    Albumin 05/09/2024 4.6  3.2 - 4.8 g/dL Final    Globulin  05/09/2024 2.5  2.0 - 3.5 g/dL Final    A/G Ratio 05/09/2024 1.8  1.0 - 2.0  Final    Patient Fasting for CMP? 05/09/2024 Yes   Final    WBC 05/09/2024 6.9  4.0 - 11.0 x10(3) uL Final    RBC 05/09/2024 4.92  3.80 - 5.80 x10(6)uL Final    HGB 05/09/2024 15.5  13.0 - 17.5 g/dL Final    HCT 05/09/2024 46.6  39.0 - 53.0 % Final    MCV 05/09/2024 94.7  80.0 - 100.0 fL Final    MCH 05/09/2024 31.5  26.0 - 34.0 pg Final    MCHC 05/09/2024 33.3  31.0 - 37.0 g/dL Final    RDW 05/09/2024 14.4  11.0 - 15.0 % Final    RDW-SD 05/09/2024 50.5 (H)  35.1 - 46.3 fL Final    PLT 05/09/2024 229.0  150.0 - 450.0 10(3)uL Final    HgbA1C 05/09/2024 5.9 (H)  <5.7 % Final    Estimated Average Glucose 05/09/2024 123  68 - 126 mg/dL Final    Cholesterol, Total 05/09/2024 140  <200 mg/dL Final    HDL Cholesterol 05/09/2024 52  40 - 59 mg/dL Final    Triglycerides 05/09/2024 108  30 - 149 mg/dL Final    LDL Cholesterol 05/09/2024 68  <100 mg/dL Final    VLDL 05/09/2024 16  0 - 30 mg/dL Final    Non HDL Chol 05/09/2024 88  <130 mg/dL Final    Patient Fasting for Lipid? 05/09/2024 Yes   Final    Prostate Specific Antigen Screen  05/09/2024 0.92  <=4.00 Final    H pylori Breath Test 05/09/2024 Negative  Negative Final   ]    Radiology Imaging:  NA  ASSESSMENT AND PLAN:  Fco is a pleasant 64-year-old male who presents with right-sided low back pain with radiation down the right leg along with heaviness in both legs.  His symptoms are aggravated with sitting for long period of time as well as standing and walking.  His symptoms are improved while walking with a shopping cart.  He has weakness during right great toe extension.  I believe his symptoms are due to lumbar radiculopathy as well as stenosis with neurogenic claudication.  I recommending x-rays of the lumbar spine, formal physical therapy, Naprosyn, and gabapentin.  He will follow-up with us in about 6 to 8 weeks.  If symptoms continue, then I would recommend an MRI of the lumbar spine.  I am not recommending NSAIDs for longer than 2 weeks given his history of  cardiomyopathy and hypertension.  Assessment & Plan  Lumbar stenosis with neurogenic claudication  Chronic lumbar stenosis with neurogenic claudication, presenting with leg weakness and heaviness, particularly with prolonged standing and walking. Symptoms improve with sitting and using a shopping cart, consistent with lumbar stenosis.  - Order x-rays of the lumbar spine.  - Initiate [facility] at the [facility].  - Prescribe Naprosyn, one tablet twice a day for two weeks, then as needed.  - Prescribe gabapentin, starting with one tablet three times a day, increasing to two tablets three times a day if needed.  - Follow up in 6-8 weeks with Aliza.  - Consider MRI of the lumbar spine if symptoms do not improve after physical therapy.  - Consider lumbar spine injection if MRI indicates and symptoms persist.    Pinched nerve in lumbar spine  Suspected pinched nerve at the L5 nerve root, causing pain radiating down the right leg to the top of the foot and first and second toes. Symptoms are consistent with L5 radiculopathy.    Degenerative disc disease  Degenerative disc disease contributing to current lumbar symptoms. Previous x-rays indicated degeneration at L4 and L5 levels.    Arthritis  Arthritis likely contributing to overall back pain and mobility issues.    Cardiomyopathy  Cardiomyopathy with consideration given to medication interactions and side effects.    Hypertension  Hypertension with consideration given to medication interactions and side effects.    RTC in 2 months  Discharge Instructions were provided as documented in AVS summary.  The patient was in agreement with the assessment and plan.  All questions were answered.  There were no barriers to learning.         1. Biomechanical lesion    2. Myalgia    3. Facet syndrome, lumbar    4. Mechanical low back pain    5. Lumbar spondylosis    6. Spinal stenosis of lumbar region with neurogenic claudication    7. Lumbar foraminal stenosis    8. Bulge  of lumbar disc without myelopathy    9. Lumbar radiculopathy    10. Hyperlipidemia, unspecified hyperlipidemia type    11. Essential hypertension    12. Dilated cardiomyopathy (HCC)        Alex B. Behar MD  Physical Medicine and Rehabilitation/Sports Medicine  [address] DIIME Cures Act Notice to Patient: Medical documents like this are made available to patients in the interest of transparency. However, be advised this is a medical document and it is intended as [id number] communication between your medical providers. This medical document may contain abbreviations, assessments, medical data, and results or other terms that are unfamiliar. Medical documents are intended to carry relevant information, facts as evident, and the clinical opinion of the practitioner. As such, this medical document may be written in language that appears blunt or direct. You are encouraged to contact your medical provider and/or [facility] Patient Experience if you have any questions about this medical document.   Abridge tool was used for dictation purposes only and the patient was not recorded at any point during the visit.          [1]   Past Medical History:   Aortic atherosclerosis    CXR 1-18    Back problem    SCIATICA PAIN SOMETIMES    Dilated cardiomyopathy (HCC)    Echo  with EF 25-30%, mild MR; cardiac cath  without obstructive CAD; Echo  with EF 44%, mild MR    Disorder of liver    FATTY LIVER    Esophageal reflux    Essential hypertension    High blood pressure    High cholesterol    Hypercholesterolemia    Left bundle branch block    Prediabetes    Visual impairment    READING GLASSES   [2]   Past Surgical History:  Procedure Laterality Date    Other      Laceration left wrist with ulnar nerve injury   [3]   Family History  Problem Relation Age of Onset    Other (Stomach Cancer [Other]) Father          age 49    Hypertension Maternal Grandmother     Hypertension  Maternal Aunt     Hypertension Maternal Uncle    [4]   Current Outpatient Medications   Medication Sig Dispense Refill    naproxen (NAPROSYN) 500 MG Oral Tab Take 1 tablet (500 mg total) by mouth 2 (two) times daily with meals. Take for 2 weeks as directed and then as needed. 60 tablet 0    gabapentin 100 MG Oral Cap Take 1 capsule (100 mg total) by mouth 3 (three) times daily. 90 capsule 0    famotidine 20 MG Oral Tab Take 1 tablet (20 mg total) by mouth in the morning.      calcium carbonate 500 MG Oral Chew Tab Chew 1 tablet (500 mg total) by mouth nightly as needed for Heartburn.      dicyclomine 10 MG Oral Cap Take 1 capsule (10 mg total) by mouth in the morning and 1 capsule (10 mg total) before bedtime. (Patient taking differently: Take 1 capsule (10 mg total) by mouth daily.) 180 capsule 3    carvedilol 25 MG Oral Tab Take 1 tablet (25 mg total) by mouth 2 (two) times daily with meals.      rosuvastatin 20 MG Oral Tab Take 1 tablet (20 mg total) by mouth nightly.      sacubitril-valsartan (ENTRESTO) 24-26 MG Oral Tab Take 1 tablet by mouth 2 (two) times daily.      aspirin 81 MG Oral Chew Tab Chew 1 tablet (81 mg total) by mouth daily. 30 tablet 0   [5] No Known Allergies

## 2025-07-14 ENCOUNTER — TELEPHONE (OUTPATIENT)
Dept: PHYSICAL THERAPY | Facility: HOSPITAL | Age: 65
End: 2025-07-14

## 2025-07-15 ENCOUNTER — OFFICE VISIT (OUTPATIENT)
Dept: PHYSICAL THERAPY | Age: 65
End: 2025-07-15
Attending: PHYSICAL MEDICINE & REHABILITATION
Payer: COMMERCIAL

## 2025-07-15 DIAGNOSIS — M48.062 SPINAL STENOSIS OF LUMBAR REGION WITH NEUROGENIC CLAUDICATION: ICD-10-CM

## 2025-07-15 DIAGNOSIS — M48.061 LUMBAR FORAMINAL STENOSIS: ICD-10-CM

## 2025-07-15 DIAGNOSIS — M47.816 LUMBAR SPONDYLOSIS: ICD-10-CM

## 2025-07-15 DIAGNOSIS — M99.9 BIOMECHANICAL LESION: Primary | ICD-10-CM

## 2025-07-15 DIAGNOSIS — M54.16 LUMBAR RADICULOPATHY: ICD-10-CM

## 2025-07-15 DIAGNOSIS — M51.369 BULGE OF LUMBAR DISC WITHOUT MYELOPATHY: ICD-10-CM

## 2025-07-15 DIAGNOSIS — M79.10 MYALGIA: ICD-10-CM

## 2025-07-15 DIAGNOSIS — M47.816 FACET SYNDROME, LUMBAR: ICD-10-CM

## 2025-07-15 DIAGNOSIS — M54.59 MECHANICAL LOW BACK PAIN: ICD-10-CM

## 2025-07-15 PROCEDURE — 97110 THERAPEUTIC EXERCISES: CPT | Performed by: PHYSICAL THERAPIST

## 2025-07-15 PROCEDURE — 97161 PT EVAL LOW COMPLEX 20 MIN: CPT | Performed by: PHYSICAL THERAPIST

## 2025-07-15 NOTE — PROGRESS NOTES
SPINE EVALUATION:     Diagnosis:   Biomechanical lesion (M99.9)  Myalgia (M79.10)  Facet syndrome, lumbar (M47.816)  Mechanical low back pain (M54.59)  Lumbar spondylosis (M47.816)  Spinal stenosis of lumbar region with neurogenic claudication (M48.062)  Lumbar foraminal stenosis (M48.061)  Bulge of lumbar disc without myelopathy (M51.369)  Lumbar radiculopathy (M54.16) Patient:  Fco Lopez (64 year old, male)        Referring Provider: Alex Behar  Today's Date   7/15/2025    Precautions:      Date of Evaluation: 07/15/25  Next MD visit: No data recorded  Date of Surgery: No data recorded     PATIENT SUMMARY     Summary of chief complaints: low back pain radaiting to R LE  History of current condition: Patient with a history of back pain. Describes increased R LE symptoms prompting him to see MD. Now referred to PT for eval and tx.   Pain level: current 3 /10, at best 0 /10, at worst 8 /10  Description of symptoms: Low back pain radiating to R LE. Symptoms are worse with sitting.   Occupation: retired   Leisure activities/Hobbies: yard work   Prior level of function: better mobility and tolerance to activites.  Current limitations: decreased trunk mobility, decreased tolerance to sitting and walking activities.  Pt goals: relief from pain and be able to move better  Red flag signs/symptoms:      Past medical history was reviewed with Fco.  Significant findings include:    Imaging/Tests:     Fco  has a past medical history of Aortic atherosclerosis, Back problem, Dilated cardiomyopathy (HCC) (09/2021), Disorder of liver, Esophageal reflux, Essential hypertension (2010), High blood pressure, High cholesterol, Hypercholesterolemia, Left bundle branch block (09/2021), Prediabetes, and Visual impairment.  He  has a past surgical history that includes other (1980s).    ASSESSMENT  Fco presents to physical therapy evaluation with primary c/o low back pain radaiting to R LE. The results of the objective tests and  measures show presents with decreased trunk and LE mobility due to lumbar spine dysfunction. Functional deficits include but are not limited to decreased trunk mobility, decreased tolerance to sitting and walking activities.. Signs and symptoms are consistent with diagnosis of Biomechanical lesion (M99.9)  Myalgia (M79.10)  Facet syndrome, lumbar (M47.816)  Mechanical low back pain (M54.59)  Lumbar spondylosis (M47.816)  Spinal stenosis of lumbar region with neurogenic claudication (M48.062)  Lumbar foraminal stenosis (M48.061)  Bulge of lumbar disc without myelopathy (M51.369)  Lumbar radiculopathy (M54.16). Pt and PT discussed evaluation findings, pathology, POC and HEP.  Pt voiced understanding and performs HEP correctly without reported pain. Skilled Physical Therapy is medically necessary to address the above impairments and reach functional goals.    OBJECTIVE:      Musculoskeletal:  Observation/Posture: -- (demonstrated flexed trunk with decreased lumbar lordosis)   Accessory Motion:     Palpation:       Special tests:   -- ((-) SLR, (-) Pete's)     ROM and Strength:  (* denotes performed with pain)  Trunk ROM is WFL into flexion and moderately limited into all other planes. Radicular symptoms do not change with repeated flexion. R LE symptoms decreased when prone or during prone on elbows.    Flexibility:  (+) tightness B hamstrings and hip flexors    Neurological:  Sensation: WFL except  -- (decreased sensation on R lower leg)     Balance and Functional Mobility:  Gait: pt ambulates on level ground with -- (walks with slight R-sided limp and with flexed trunk posture).       Today's Treatment and Response:   Pt education was provided on exam findings, treatment diagnosis, treatment plan, expectations, and prognosis.    Today's Treatment       7/15/2025   Spine Treatment   Therapeutic Exercise X25min  - reviewed posture and body mechanics  - prone on elbows x 5 min  - prone press ups 10x  - repeat prone on  elbows x 5min  - standing trunk extensions 10x  - instructed on HEP. Handouts were created and issued to patient.   Therapeutic Exercise Minutes 25   Evaluation Minutes 15   Total Time Of Timed Procedures 25   Total Time Of Service-Based Procedures 15   Total Treatment Time 40        Patient was instructed in and issued a HEP for:      Charges:  PT EVAL:  , PTEVAL, EX2  In agreement with evaluation findings and clinical rationale, this evaluation involved LOW COMPLEXITY decision making due to no personal factors/comorbidities, 1-2 body structures involved/activity limitations, and stable symptoms as documented in the evaluation.                                                                         PLAN OF CARE:      Goals: (to be met in 8 visits)    Not Met Progress Toward Partially Met Met   Patient will be independent with their home program, its progression, and management of residual symptoms. [] [] [] []   Patient will report pain of not more than 1/10 during activity. [] [] [] []   Patient will improve trunk ROM to WFL into all planes to improve trunk and LE mobility. [] [] [] []   Increase strength to 5/5 throughout to be able to perform previous activities without difficulty. [] [] [] []   Patient will verbalize and demonstrate strategies to improve posture and body mechanics during activity. [] [] [] []   Patient will resume all walking activities including going up and down steps without any major postural deviations and normal gait pattern. [] [] [] []          Frequency / Duration: Patient will be seen 1-2x/week or a total of 8  visits over a 90 day period. Treatment will include: Manual Therapy; Neuromuscular Re-education; Home Exercise Program instruction; Therapeutic Exercise; Therapeutic Activities; Other (use comment) (modalities prn)    Education or treatment limitation: None   Rehab Potential: good     Oswestry Disability Index Score  Score: (Patient-Rptd) 18 % (7/15/2025 10:22  AM)      Patient/Family/Caregiver was advised of these findings, precautions, and treatment options and has agreed to actively participate in planning and for this course of care.    Thank you for your referral. Please co-sign or sign and return this letter via fax as soon as possible to [phone]. If you have any questions, please contact me at Dept: [phone]    Sincerely,  Electronically signed by therapist: Darrel Chen Jr., PT    Certification From: 7/15/2025  To: 10/13/2025

## 2025-07-29 ENCOUNTER — OFFICE VISIT (OUTPATIENT)
Dept: PHYSICAL THERAPY | Age: 65
End: 2025-07-29
Attending: PHYSICAL MEDICINE & REHABILITATION
Payer: COMMERCIAL

## 2025-07-29 PROCEDURE — 97110 THERAPEUTIC EXERCISES: CPT | Performed by: PHYSICAL THERAPIST

## 2025-07-31 ENCOUNTER — TELEPHONE (OUTPATIENT)
Dept: PHYSICAL THERAPY | Age: 65
End: 2025-07-31

## 2025-07-31 ENCOUNTER — APPOINTMENT (OUTPATIENT)
Dept: PHYSICAL THERAPY | Age: 65
End: 2025-07-31
Attending: PHYSICAL MEDICINE & REHABILITATION
Payer: COMMERCIAL

## 2025-08-05 ENCOUNTER — OFFICE VISIT (OUTPATIENT)
Dept: PHYSICAL THERAPY | Age: 65
End: 2025-08-05
Attending: PHYSICAL MEDICINE & REHABILITATION

## 2025-08-05 PROCEDURE — 97112 NEUROMUSCULAR REEDUCATION: CPT | Performed by: PHYSICAL THERAPIST

## 2025-08-05 PROCEDURE — 97110 THERAPEUTIC EXERCISES: CPT | Performed by: PHYSICAL THERAPIST

## 2025-08-07 ENCOUNTER — OFFICE VISIT (OUTPATIENT)
Dept: PHYSICAL THERAPY | Age: 65
End: 2025-08-07
Attending: PHYSICAL MEDICINE & REHABILITATION

## 2025-08-07 PROCEDURE — 97112 NEUROMUSCULAR REEDUCATION: CPT | Performed by: PHYSICAL THERAPIST

## 2025-08-07 PROCEDURE — 97110 THERAPEUTIC EXERCISES: CPT | Performed by: PHYSICAL THERAPIST

## 2025-08-11 ENCOUNTER — TELEPHONE (OUTPATIENT)
Dept: PHYSICAL THERAPY | Age: 65
End: 2025-08-11

## 2025-08-11 ENCOUNTER — APPOINTMENT (OUTPATIENT)
Dept: PHYSICAL THERAPY | Age: 65
End: 2025-08-11
Attending: PHYSICAL MEDICINE & REHABILITATION

## 2025-08-14 ENCOUNTER — OFFICE VISIT (OUTPATIENT)
Dept: PHYSICAL THERAPY | Age: 65
End: 2025-08-14
Attending: PHYSICAL MEDICINE & REHABILITATION

## 2025-08-19 ENCOUNTER — APPOINTMENT (OUTPATIENT)
Dept: PHYSICAL THERAPY | Age: 65
End: 2025-08-19
Attending: PHYSICAL MEDICINE & REHABILITATION

## 2025-08-21 ENCOUNTER — APPOINTMENT (OUTPATIENT)
Dept: PHYSICAL THERAPY | Age: 65
End: 2025-08-21
Attending: PHYSICAL MEDICINE & REHABILITATION

## (undated) NOTE — Clinical Note
Dear Aleksander Archibald,    Thank you for sending Isadora Cruz to see me for physiatry consultation. I appreciate your confidence in me to care for your patients. Please feel free call me with any questions at [phone] or contact me through [facility]     Sincerely,  Raquel

## (undated) NOTE — LETTER
Date: 1/21/2019    Patient Name: Ramin Negron          To Whom it may concern: This letter has been written at the patient's request. The above patient was seen at the [facility] for treatment of a medical condition.     This patient shoul

## (undated) NOTE — LETTER
[address], [facility]   Date:   9/1/2021     Name:   Ildefonso Boyce    YOB: 1960   MRN:   [id number]       WHERE IS YOUR PAIN NOW?   Dc Camacho the areas on your body where you feel the described

## (undated) NOTE — LETTER
21      Patient: Emily Nolen  : 1960 Visit date: 2021    Dear Omero Goff,      I examined your patient in consultation today.     He has a volar ganglion of the right wrist of recent onset which has increased in size somewhat

## (undated) NOTE — LETTER
[address], [place]  Authorization for Invasive Procedures  1.  I hereby authorize Dr. Cheri Tobar , my physician and whomever may be designated as the doctor's assistant, to perform the following operation and/or procedure:  Cardiac cat careful testing and screening of blood and blood products, I may still be subject to ill effects as a result of recieving a blood transfusion an/or blood producst. The following are some, but not all, of the potential risks that can occur: fever and allerg acknowledge that my physician has explained sedation/analgesia administration to me including the risks and benefits. I consent to the administration of sedation/analgesia as may be necessary or desirable in the judgment of my physician.      Signature of JAKE

## (undated) NOTE — LETTER
1/16/2018          To Whom It May Concern:    Cliff Watkins is currently under my medical care and may not return to work at this time. Please excuse [employer] for 3 days. He may return to work on 1/18/18 without any restriction.   If you require additional

## (undated) NOTE — LETTER
1/31/2018          To Whom It May Concern:    Nidhi Olmedo is currently under my medical care and was evaluated in the office today. He may return to work on 2/1/2018. Activity is restricted as follows: none.     If you require additional information ple

## (undated) NOTE — LETTER
1/22/2021              [address] [facility] [address]         To Whom It May Concern,    Mr. Sheri Nichole saw me today in my office for a physical exam.    Sincerely,    MD Wallace Apple Melliste

## (undated) NOTE — LETTER
[address] [facility]    Consent for Coronary CT Angiography    Your doctor has recommended that you have a Coronary CT Angiography procedure.  Coronary CT Angiography is a diagnostic procedure using computed tomography to scan the can range from very minor to very serious leading to a life threatening situation or even death. Please be sure to communicate any allergy you may have to your caregiver immediately.     The information that is obtained during your testing will be treated a

## (undated) NOTE — LETTER
1/23/2018              [address] [facility] [address]         To Whom It May Concern,    Mr. Rajendra Loving was seen by me today for evaluation of an upper respiratory infection.   He may return to work, likely in [address]

## (undated) NOTE — LETTER
03/30/20        [address] [place] [address]  [address] [facility] [address]      Dear Gianfranco Barth,    Our records indicate that you have outstanding lab work and or testing that was ordered for you and has not yet been completed:  Orders Placed This Encounter      Lip